# Patient Record
Sex: FEMALE | Race: BLACK OR AFRICAN AMERICAN | NOT HISPANIC OR LATINO | Employment: PART TIME | URBAN - METROPOLITAN AREA
[De-identification: names, ages, dates, MRNs, and addresses within clinical notes are randomized per-mention and may not be internally consistent; named-entity substitution may affect disease eponyms.]

---

## 2017-07-03 ENCOUNTER — HISTORICAL (OUTPATIENT)
Dept: ADMINISTRATIVE | Facility: HOSPITAL | Age: 52
End: 2017-07-03

## 2017-11-01 ENCOUNTER — HISTORICAL (OUTPATIENT)
Dept: ADMINISTRATIVE | Facility: HOSPITAL | Age: 52
End: 2017-11-01

## 2017-11-17 ENCOUNTER — HISTORICAL (OUTPATIENT)
Dept: RADIOLOGY | Facility: HOSPITAL | Age: 52
End: 2017-11-17

## 2018-07-30 ENCOUNTER — HISTORICAL (OUTPATIENT)
Dept: SURGERY | Facility: HOSPITAL | Age: 53
End: 2018-07-30

## 2018-08-01 ENCOUNTER — HISTORICAL (OUTPATIENT)
Dept: RADIOLOGY | Facility: HOSPITAL | Age: 53
End: 2018-08-01

## 2018-08-06 ENCOUNTER — HISTORICAL (OUTPATIENT)
Dept: ANESTHESIOLOGY | Facility: HOSPITAL | Age: 53
End: 2018-08-06

## 2018-10-24 ENCOUNTER — HISTORICAL (OUTPATIENT)
Dept: RADIOLOGY | Facility: HOSPITAL | Age: 53
End: 2018-10-24

## 2018-12-19 ENCOUNTER — HISTORICAL (OUTPATIENT)
Dept: CARDIOLOGY | Facility: HOSPITAL | Age: 53
End: 2018-12-19

## 2021-04-13 ENCOUNTER — HISTORICAL (OUTPATIENT)
Dept: ADMINISTRATIVE | Facility: HOSPITAL | Age: 56
End: 2021-04-13

## 2022-04-28 NOTE — OP NOTE
ADMITTING DIAGNOSIS:  Left carpal tunnel syndrome.    PROCEDURE:  Left carpal tunnel release.    BRIEF HISTORY:  Patient is a 53-year-old  female with narcolepsy who has type 2 diabetes, asthma on an inhaler, hypercholesterolemia, bipolar depression, hypertension, and is morbidly obese at 5 feet 3 inches, 228 pounds.  The patient has hepatitis C secondary to phlebotomy stick, but has a negative viral titer.  She is hypothyroid with OCD and has osteoporosis as well.  The patient required left carpal tunnel release for carpal tunnel syndrome noted on EMG studies and clinically upon presentation.  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  She is status post right carpal tunnel release in the 1990s and has a splint on her left hand.  The left hand is much worse than the right hand.    PROCEDURE:  The patient was brought to the operating room, underwent a tourniquet Eschar marking, local and IV sedation was given, and the patient had incision over the palmar crease with dissection through skin, subcutaneous tissue down to the palmar fascia.  Palmar fascia was opened and the sheath overlying the nerve was released.  Overall, the patient did very well.  Had no problems or difficulties.  The skin and subcu was closed with 4-0 nylon.  Sterile dressings were applied.  No problems were encountered.  The patient tolerated the procedure well.     I appreciate the consultation/referral from her physician, Dr. Barros as well as Dr. Stone at the [facility].        ARNAUD/GAMAL   DD: 08/06/2018 1136   DT: 08/06/2018 1154  Job # [id number]    cc: Dr. Roscoe Stone,

## 2022-06-22 RX ORDER — PREGABALIN 150 MG/1
150 CAPSULE ORAL 2 TIMES DAILY
Qty: 60 CAPSULE | Refills: 3 | Status: SHIPPED | OUTPATIENT
Start: 2022-06-22 | End: 2022-07-29 | Stop reason: SDUPTHER

## 2022-06-22 NOTE — TELEPHONE ENCOUNTER
----- Message from Jessica Tanner sent at 6/22/2022  1:47 PM CDT -----  Regarding: Medication refill  Patient called requesting a refill for Lyrica... [facility]Errol Hernandez

## 2022-07-29 ENCOUNTER — OFFICE VISIT (OUTPATIENT)
Dept: RHEUMATOLOGY | Facility: CLINIC | Age: 57
End: 2022-07-29
Payer: COMMERCIAL

## 2022-07-29 VITALS
BODY MASS INDEX: 41.78 KG/M2 | WEIGHT: 235.81 LBS | OXYGEN SATURATION: 96 % | SYSTOLIC BLOOD PRESSURE: 102 MMHG | HEART RATE: 87 BPM | TEMPERATURE: 98 F | HEIGHT: 63 IN | DIASTOLIC BLOOD PRESSURE: 76 MMHG | RESPIRATION RATE: 18 BRPM

## 2022-07-29 DIAGNOSIS — M79.7 FIBROMYALGIA SYNDROME: ICD-10-CM

## 2022-07-29 DIAGNOSIS — M75.101 ROTATOR CUFF SYNDROME OF RIGHT SHOULDER: ICD-10-CM

## 2022-07-29 DIAGNOSIS — M51.36 DISC DEGENERATION, LUMBAR: ICD-10-CM

## 2022-07-29 DIAGNOSIS — M32.19 OTHER SYSTEMIC LUPUS ERYTHEMATOSUS WITH OTHER ORGAN INVOLVEMENT: Primary | ICD-10-CM

## 2022-07-29 DIAGNOSIS — F51.01 PRIMARY INSOMNIA: ICD-10-CM

## 2022-07-29 PROBLEM — F31.9 BIPOLAR DISORDER: Status: ACTIVE | Noted: 2022-07-29

## 2022-07-29 PROBLEM — J44.9 CHRONIC OBSTRUCTIVE PULMONARY DISEASE: Status: ACTIVE | Noted: 2022-07-29

## 2022-07-29 PROBLEM — K21.9 GASTROESOPHAGEAL REFLUX DISEASE: Status: ACTIVE | Noted: 2022-07-29

## 2022-07-29 PROCEDURE — 3074F PR MOST RECENT SYSTOLIC BLOOD PRESSURE < 130 MM HG: ICD-10-PCS | Mod: CPTII,S$GLB,, | Performed by: INTERNAL MEDICINE

## 2022-07-29 PROCEDURE — 3074F SYST BP LT 130 MM HG: CPT | Mod: CPTII,S$GLB,, | Performed by: INTERNAL MEDICINE

## 2022-07-29 PROCEDURE — 3008F BODY MASS INDEX DOCD: CPT | Mod: CPTII,S$GLB,, | Performed by: INTERNAL MEDICINE

## 2022-07-29 PROCEDURE — 3078F DIAST BP <80 MM HG: CPT | Mod: CPTII,S$GLB,, | Performed by: INTERNAL MEDICINE

## 2022-07-29 PROCEDURE — 99999 PR PBB SHADOW E&M-EST. PATIENT-LVL V: ICD-10-PCS | Mod: PBBFAC,,, | Performed by: INTERNAL MEDICINE

## 2022-07-29 PROCEDURE — 1159F PR MEDICATION LIST DOCUMENTED IN MEDICAL RECORD: ICD-10-PCS | Mod: CPTII,S$GLB,, | Performed by: INTERNAL MEDICINE

## 2022-07-29 PROCEDURE — 1160F PR REVIEW ALL MEDS BY PRESCRIBER/CLIN PHARMACIST DOCUMENTED: ICD-10-PCS | Mod: CPTII,S$GLB,, | Performed by: INTERNAL MEDICINE

## 2022-07-29 PROCEDURE — 3078F PR MOST RECENT DIASTOLIC BLOOD PRESSURE < 80 MM HG: ICD-10-PCS | Mod: CPTII,S$GLB,, | Performed by: INTERNAL MEDICINE

## 2022-07-29 PROCEDURE — 3008F PR BODY MASS INDEX (BMI) DOCUMENTED: ICD-10-PCS | Mod: CPTII,S$GLB,, | Performed by: INTERNAL MEDICINE

## 2022-07-29 PROCEDURE — 99214 PR OFFICE/OUTPT VISIT, EST, LEVL IV, 30-39 MIN: ICD-10-PCS | Mod: S$GLB,,, | Performed by: INTERNAL MEDICINE

## 2022-07-29 PROCEDURE — 4010F PR ACE/ARB THEARPY RXD/TAKEN: ICD-10-PCS | Mod: CPTII,S$GLB,, | Performed by: INTERNAL MEDICINE

## 2022-07-29 PROCEDURE — 1159F MED LIST DOCD IN RCRD: CPT | Mod: CPTII,S$GLB,, | Performed by: INTERNAL MEDICINE

## 2022-07-29 PROCEDURE — 99215 OFFICE O/P EST HI 40 MIN: CPT | Mod: PBBFAC | Performed by: INTERNAL MEDICINE

## 2022-07-29 PROCEDURE — 4010F ACE/ARB THERAPY RXD/TAKEN: CPT | Mod: CPTII,S$GLB,, | Performed by: INTERNAL MEDICINE

## 2022-07-29 PROCEDURE — 99999 PR PBB SHADOW E&M-EST. PATIENT-LVL V: CPT | Mod: PBBFAC,,, | Performed by: INTERNAL MEDICINE

## 2022-07-29 PROCEDURE — 99214 OFFICE O/P EST MOD 30 MIN: CPT | Mod: S$GLB,,, | Performed by: INTERNAL MEDICINE

## 2022-07-29 PROCEDURE — 1160F RVW MEDS BY RX/DR IN RCRD: CPT | Mod: CPTII,S$GLB,, | Performed by: INTERNAL MEDICINE

## 2022-07-29 RX ORDER — CYCLOSPORINE 0.5 MG/ML
1 EMULSION OPHTHALMIC 2 TIMES DAILY
Qty: 3 EACH | Refills: 3 | Status: SHIPPED | OUTPATIENT
Start: 2022-07-29 | End: 2023-08-07

## 2022-07-29 RX ORDER — ALLOPURINOL 300 MG/1
300 TABLET ORAL DAILY
Qty: 90 TABLET | Refills: 3 | Status: SHIPPED | OUTPATIENT
Start: 2022-07-29

## 2022-07-29 RX ORDER — TRIAMCINOLONE ACETONIDE 1 MG/G
CREAM TOPICAL 2 TIMES DAILY
COMMUNITY
Start: 2022-07-01 | End: 2023-04-10

## 2022-07-29 RX ORDER — TIZANIDINE 4 MG/1
4 TABLET ORAL NIGHTLY
COMMUNITY
Start: 2022-07-22 | End: 2022-07-29 | Stop reason: SDUPTHER

## 2022-07-29 RX ORDER — CLONAZEPAM 0.5 MG/1
0.5 TABLET ORAL NIGHTLY PRN
Qty: 90 TABLET | Refills: 3 | Status: SHIPPED | OUTPATIENT
Start: 2022-07-29 | End: 2023-04-10 | Stop reason: SDUPTHER

## 2022-07-29 RX ORDER — CLONAZEPAM 0.5 MG/1
0.5 TABLET ORAL NIGHTLY PRN
COMMUNITY
Start: 2022-04-05 | End: 2022-07-29 | Stop reason: SDUPTHER

## 2022-07-29 RX ORDER — AMOXICILLIN 500 MG
2 CAPSULE ORAL DAILY
COMMUNITY

## 2022-07-29 RX ORDER — LEVOCETIRIZINE DIHYDROCHLORIDE 5 MG/1
5 TABLET, FILM COATED ORAL DAILY
COMMUNITY
Start: 2022-05-04

## 2022-07-29 RX ORDER — CYCLOSPORINE 0.5 MG/ML
1 EMULSION OPHTHALMIC 2 TIMES DAILY
COMMUNITY
Start: 2022-07-13 | End: 2022-07-29 | Stop reason: SDUPTHER

## 2022-07-29 RX ORDER — UMECLIDINIUM 62.5 UG/1
AEROSOL, POWDER ORAL DAILY
COMMUNITY
Start: 2022-07-21 | End: 2022-12-01

## 2022-07-29 RX ORDER — IPRATROPIUM BROMIDE 42 UG/1
1 SPRAY, METERED NASAL 2 TIMES DAILY
COMMUNITY
Start: 2022-03-02

## 2022-07-29 RX ORDER — CELECOXIB 200 MG/1
200 CAPSULE ORAL 2 TIMES DAILY PRN
Qty: 180 CAPSULE | Refills: 3 | Status: SHIPPED | OUTPATIENT
Start: 2022-07-29 | End: 2022-12-01

## 2022-07-29 RX ORDER — PREGABALIN 150 MG/1
150 CAPSULE ORAL 3 TIMES DAILY
Qty: 270 CAPSULE | Refills: 3 | Status: SHIPPED | OUTPATIENT
Start: 2022-07-29 | End: 2022-11-03 | Stop reason: SDUPTHER

## 2022-07-29 RX ORDER — ROSUVASTATIN CALCIUM 5 MG/1
5 TABLET, COATED ORAL DAILY
COMMUNITY
Start: 2022-02-22

## 2022-07-29 RX ORDER — CELECOXIB 200 MG/1
200 CAPSULE ORAL 2 TIMES DAILY
COMMUNITY
Start: 2022-07-15 | End: 2022-07-29 | Stop reason: SDUPTHER

## 2022-07-29 RX ORDER — FERROUS SULFATE 325(65) MG
325 TABLET ORAL DAILY
COMMUNITY
Start: 2022-04-22

## 2022-07-29 RX ORDER — FLUTICASONE FUROATE AND VILANTEROL TRIFENATATE 200; 25 UG/1; UG/1
1 POWDER RESPIRATORY (INHALATION) DAILY
COMMUNITY
Start: 2022-07-24 | End: 2022-12-01

## 2022-07-29 RX ORDER — LIDOCAINE 50 MG/G
1 PATCH TOPICAL DAILY
COMMUNITY
Start: 2022-06-28 | End: 2023-04-10

## 2022-07-29 RX ORDER — TIZANIDINE 4 MG/1
4 TABLET ORAL NIGHTLY
Qty: 90 TABLET | Refills: 3 | Status: SHIPPED | OUTPATIENT
Start: 2022-07-29 | End: 2022-12-01 | Stop reason: SDUPTHER

## 2022-07-29 RX ORDER — ALLOPURINOL 300 MG/1
300 TABLET ORAL DAILY
COMMUNITY
Start: 2022-06-20 | End: 2022-07-29 | Stop reason: SDUPTHER

## 2022-07-29 NOTE — PROGRESS NOTES
"Subjective:       Patient ID: Talia Salas is a 57 y.o. female.    Chief Complaint: Follow-up (Patient is stating that Tizanidine is helping her a lot! She isn't in constant pain /Than before. )    The patient is complaining of joint pain involving the MCP PIP wrist elbow shoulders hips knees and ankles bilaterally.  The pain is 8/10 in intensity dull in quality and continuous.  That is associated with a morning stiffness lasting for more than 60 minutes.  Is also having difficulty maintaining a good night of sleep.  This has been associated with myalgias.  Muscle aches are 7/10 in intensity dull in quality and continuous.  They are associated with fatigue.  No fever no chills no others.      Review of Systems   Constitutional: Negative for appetite change, chills and fever.   HENT: Negative for congestion, ear pain, mouth sores, nosebleeds and trouble swallowing.    Eyes: Negative for photophobia and discharge.   Respiratory: Negative for chest tightness and shortness of breath.    Cardiovascular: Negative for chest pain.   Gastrointestinal: Negative for abdominal pain and vomiting.   Endocrine: Negative.    Genitourinary: Negative for hematuria.   Musculoskeletal:        As per HPI   Skin: Negative for rash.   Neurological: Negative for weakness.         Objective:   /76 (BP Location: Right arm, Patient Position: Sitting, BP Method: Large (Automatic))   Pulse 87   Temp 98.3 °F (36.8 °C) (Oral)   Resp 18   Ht 5' 3" (1.6 m)   Wt 107 kg (235 lb 12.8 oz)   SpO2 96%   BMI 41.77 kg/m²      Physical Exam   Constitutional: She is oriented to person, place, and time. She appears well-developed and well-nourished. No distress.   HENT:   Head: Normocephalic and atraumatic.   Right Ear: External ear normal.   Left Ear: External ear normal.   Eyes: Pupils are equal, round, and reactive to light.   Cardiovascular: Normal rate, regular rhythm and normal heart sounds.   Pulmonary/Chest: Breath sounds " normal.   Abdominal: Soft. There is no abdominal tenderness.   Musculoskeletal:      Right shoulder: Tenderness present.      Left shoulder: Tenderness present.      Right elbow: Tenderness present.      Left elbow: Tenderness present.      Right wrist: Tenderness present.      Left wrist: Tenderness present.      Cervical back: Neck supple.      Right hip: Tenderness present.      Left hip: Tenderness present.      Right knee: Tenderness present.      Left knee: Tenderness present.      Right ankle: Tenderness present.      Left ankle: Tenderness present.   Lymphadenopathy:     She has no cervical adenopathy.   Neurological: She is alert and oriented to person, place, and time. She displays normal reflexes. No cranial nerve deficit or sensory deficit. She exhibits normal muscle tone. Coordination normal.   Skin: No rash noted. No erythema.   Vitals reviewed.      Right Side Rheumatological Exam     The patient is tender to palpation of the shoulder, elbow, wrist, knee, 1st PIP, 1st MCP, 2nd PIP, 2nd MCP, 3rd PIP, 3rd MCP, 4th PIP, 4th MCP, 5th PIP, hip, ankle, 1st MTP, 2nd MTP, 3rd MTP, 4th MTP, 5th MTP, 1st toe IP, 2nd toe IP, 3rd toe IP, 4th toe IP and 5th toe IP    Left Side Rheumatological Exam     The patient is tender to palpation of the shoulder, elbow, wrist, knee, 1st PIP, 1st MCP, 2nd PIP, 2nd MCP, 3rd PIP, 3rd MCP, 4th PIP, 4th MCP, 5th PIP, 5th MCP, hip, ankle, 1st MTP, 2nd MTP, 3rd MTP, 4th MTP, 5th MTP, 1st toe IP, 2nd toe IP, 3rd toe IP, 4th toe IP and 5th toe IP.         Completed Fibromyalgia exam 18/18 tender points.  No data to display     Assessment:       1. Other systemic lupus erythematosus with other organ involvement    2. Disc degeneration, lumbar    3. Fibromyalgia syndrome    4. Primary insomnia    5. Rotator cuff syndrome of right shoulder            Plan:       Problem List Items Addressed This Visit    None     Visit Diagnoses     Other systemic lupus erythematosus with other organ  involvement    -  Primary    Relevant Medications    ferrous sulfate (FEOSOL) 325 mg (65 mg iron) Tab tablet    BREO ELLIPTA 200-25 mcg/dose DsDv diskus inhaler    ipratropium (ATROVENT) 42 mcg (0.06 %) nasal spray    levocetirizine (XYZAL) 5 MG tablet    LIDOcaine (LIDODERM) 5 %    omega-3 fatty acids/fish oil (FISH OIL-OMEGA-3 FATTY ACIDS) 300-1,000 mg capsule    rosuvastatin (CRESTOR) 5 MG tablet    triamcinolone acetonide 0.1% (KENALOG) 0.1 % cream    INCRUSE ELLIPTA 62.5 mcg/actuation inhalation capsule    allopurinoL (ZYLOPRIM) 300 MG tablet    celecoxib (CELEBREX) 200 MG capsule    clonazePAM (KLONOPIN) 0.5 MG tablet    denosumab (PROLIA) 60 mg/mL Syrg    LYRICA 150 mg capsule    RESTASIS 0.05 % ophthalmic emulsion    tiZANidine (ZANAFLEX) 4 MG tablet    Disc degeneration, lumbar        Relevant Medications    ferrous sulfate (FEOSOL) 325 mg (65 mg iron) Tab tablet    BREO ELLIPTA 200-25 mcg/dose DsDv diskus inhaler    ipratropium (ATROVENT) 42 mcg (0.06 %) nasal spray    levocetirizine (XYZAL) 5 MG tablet    LIDOcaine (LIDODERM) 5 %    omega-3 fatty acids/fish oil (FISH OIL-OMEGA-3 FATTY ACIDS) 300-1,000 mg capsule    rosuvastatin (CRESTOR) 5 MG tablet    triamcinolone acetonide 0.1% (KENALOG) 0.1 % cream    INCRUSE ELLIPTA 62.5 mcg/actuation inhalation capsule    allopurinoL (ZYLOPRIM) 300 MG tablet    celecoxib (CELEBREX) 200 MG capsule    clonazePAM (KLONOPIN) 0.5 MG tablet    denosumab (PROLIA) 60 mg/mL Syrg    LYRICA 150 mg capsule    RESTASIS 0.05 % ophthalmic emulsion    tiZANidine (ZANAFLEX) 4 MG tablet    Fibromyalgia syndrome        Relevant Medications    ferrous sulfate (FEOSOL) 325 mg (65 mg iron) Tab tablet    BREO ELLIPTA 200-25 mcg/dose DsDv diskus inhaler    ipratropium (ATROVENT) 42 mcg (0.06 %) nasal spray    levocetirizine (XYZAL) 5 MG tablet    LIDOcaine (LIDODERM) 5 %    omega-3 fatty acids/fish oil (FISH OIL-OMEGA-3 FATTY ACIDS) 300-1,000 mg capsule    rosuvastatin (CRESTOR) 5 MG  tablet    triamcinolone acetonide 0.1% (KENALOG) 0.1 % cream    INCRUSE ELLIPTA 62.5 mcg/actuation inhalation capsule    allopurinoL (ZYLOPRIM) 300 MG tablet    celecoxib (CELEBREX) 200 MG capsule    clonazePAM (KLONOPIN) 0.5 MG tablet    denosumab (PROLIA) 60 mg/mL Syrg    LYRICA 150 mg capsule    RESTASIS 0.05 % ophthalmic emulsion    tiZANidine (ZANAFLEX) 4 MG tablet    Primary insomnia        Relevant Medications    ferrous sulfate (FEOSOL) 325 mg (65 mg iron) Tab tablet    BREO ELLIPTA 200-25 mcg/dose DsDv diskus inhaler    ipratropium (ATROVENT) 42 mcg (0.06 %) nasal spray    levocetirizine (XYZAL) 5 MG tablet    LIDOcaine (LIDODERM) 5 %    omega-3 fatty acids/fish oil (FISH OIL-OMEGA-3 FATTY ACIDS) 300-1,000 mg capsule    rosuvastatin (CRESTOR) 5 MG tablet    triamcinolone acetonide 0.1% (KENALOG) 0.1 % cream    INCRUSE ELLIPTA 62.5 mcg/actuation inhalation capsule    allopurinoL (ZYLOPRIM) 300 MG tablet    celecoxib (CELEBREX) 200 MG capsule    clonazePAM (KLONOPIN) 0.5 MG tablet    denosumab (PROLIA) 60 mg/mL Syrg    LYRICA 150 mg capsule    RESTASIS 0.05 % ophthalmic emulsion    tiZANidine (ZANAFLEX) 4 MG tablet    Rotator cuff syndrome of right shoulder        Relevant Medications    ferrous sulfate (FEOSOL) 325 mg (65 mg iron) Tab tablet    BREO ELLIPTA 200-25 mcg/dose DsDv diskus inhaler    ipratropium (ATROVENT) 42 mcg (0.06 %) nasal spray    levocetirizine (XYZAL) 5 MG tablet    LIDOcaine (LIDODERM) 5 %    omega-3 fatty acids/fish oil (FISH OIL-OMEGA-3 FATTY ACIDS) 300-1,000 mg capsule    rosuvastatin (CRESTOR) 5 MG tablet    triamcinolone acetonide 0.1% (KENALOG) 0.1 % cream    INCRUSE ELLIPTA 62.5 mcg/actuation inhalation capsule    allopurinoL (ZYLOPRIM) 300 MG tablet    celecoxib (CELEBREX) 200 MG capsule    clonazePAM (KLONOPIN) 0.5 MG tablet    denosumab (PROLIA) 60 mg/mL Syrg    LYRICA 150 mg capsule    RESTASIS 0.05 % ophthalmic emulsion    tiZANidine (ZANAFLEX) 4 MG tablet

## 2022-08-03 ENCOUNTER — TELEPHONE (OUTPATIENT)
Dept: RHEUMATOLOGY | Facility: CLINIC | Age: 57
End: 2022-08-03
Payer: COMMERCIAL

## 2022-08-03 NOTE — TELEPHONE ENCOUNTER
This message has already been addressed below . Thanks       ----- Message from Jessica Tanner sent at 8/3/2022 10:06 AM CDT -----  Regarding: medication  Hima with [facility] need a call back concerning medical supplies. [phone]

## 2022-08-03 NOTE — TELEPHONE ENCOUNTER
[facility]oke with [facility] and the patient gets the Prolia injection at Dr. Kimmy Abreu office . The medical supplies will be sent there. Thanks       ----- Message from Jessica Tanner sent at 8/2/2022  8:20 AM CDT -----  Regarding: medical supplies  [facility] called to confirm order for medical supplies to be delivered here for patient. [employer] container and swabs. Please call to confirm. [phone]

## 2022-11-03 DIAGNOSIS — M79.7 FIBROMYALGIA SYNDROME: ICD-10-CM

## 2022-11-03 DIAGNOSIS — M51.36 DISC DEGENERATION, LUMBAR: ICD-10-CM

## 2022-11-03 DIAGNOSIS — M32.19 OTHER SYSTEMIC LUPUS ERYTHEMATOSUS WITH OTHER ORGAN INVOLVEMENT: ICD-10-CM

## 2022-11-03 DIAGNOSIS — M75.101 ROTATOR CUFF SYNDROME OF RIGHT SHOULDER: ICD-10-CM

## 2022-11-03 DIAGNOSIS — F51.01 PRIMARY INSOMNIA: ICD-10-CM

## 2022-11-03 RX ORDER — PREGABALIN 150 MG/1
150 CAPSULE ORAL 3 TIMES DAILY
Qty: 270 CAPSULE | Refills: 3 | Status: SHIPPED | OUTPATIENT
Start: 2022-11-03 | End: 2022-12-01 | Stop reason: SDUPTHER

## 2022-11-03 NOTE — TELEPHONE ENCOUNTER
----- Message from Jessica Tanner sent at 11/3/2022  9:13 AM CDT -----  Regarding: Medication refill  Patient called requesting refill for Lyrica. [facility] on [address].

## 2022-11-08 ENCOUNTER — TELEPHONE (OUTPATIENT)
Dept: RHEUMATOLOGY | Facility: CLINIC | Age: 57
End: 2022-11-08
Payer: COMMERCIAL

## 2022-11-08 NOTE — TELEPHONE ENCOUNTER
----- Message from Elizabeth Padilla sent at 11/8/2022 10:59 AM CST -----  Regarding: medical advice  Pt is having itching episodes and was not sure if it is normal for someone with lupus.  Please call [phone] with a response

## 2022-11-09 NOTE — TELEPHONE ENCOUNTER
Spoke with patient she hasn't had any changes in detergent, or lotions , she hasn't started any new supplements at all. She does have a Cat and a Dog that goes stays inside and goes outside. I did advise her that this could be from her pets .. patient verbalized understanding . Thanks

## 2022-11-09 NOTE — TELEPHONE ENCOUNTER
Normally not. Did she should not itch. Many things can cause itching:  Supplements, new meds, dry skin, hot water, sweat, type of lotion, detergent, pets, plants, allergies, foods, and many others. You may ask her about those. Thanks bh

## 2022-12-01 ENCOUNTER — OFFICE VISIT (OUTPATIENT)
Dept: RHEUMATOLOGY | Facility: CLINIC | Age: 57
End: 2022-12-01
Payer: COMMERCIAL

## 2022-12-01 VITALS
OXYGEN SATURATION: 95 % | WEIGHT: 238.81 LBS | SYSTOLIC BLOOD PRESSURE: 118 MMHG | HEIGHT: 63 IN | TEMPERATURE: 99 F | HEART RATE: 94 BPM | DIASTOLIC BLOOD PRESSURE: 79 MMHG | BODY MASS INDEX: 42.31 KG/M2

## 2022-12-01 DIAGNOSIS — M51.36 DISC DEGENERATION, LUMBAR: ICD-10-CM

## 2022-12-01 DIAGNOSIS — F51.01 PRIMARY INSOMNIA: ICD-10-CM

## 2022-12-01 DIAGNOSIS — M32.10 SYSTEMIC LUPUS ERYTHEMATOSUS, ORGAN OR SYSTEM INVOLVEMENT UNSPECIFIED: ICD-10-CM

## 2022-12-01 DIAGNOSIS — M75.101 ROTATOR CUFF SYNDROME OF RIGHT SHOULDER: ICD-10-CM

## 2022-12-01 DIAGNOSIS — M79.7 FIBROMYALGIA SYNDROME: ICD-10-CM

## 2022-12-01 DIAGNOSIS — M32.19 OTHER SYSTEMIC LUPUS ERYTHEMATOSUS WITH OTHER ORGAN INVOLVEMENT: ICD-10-CM

## 2022-12-01 PROBLEM — M51.369 DISC DEGENERATION, LUMBAR: Status: ACTIVE | Noted: 2022-12-01

## 2022-12-01 PROCEDURE — 1159F MED LIST DOCD IN RCRD: CPT | Mod: CPTII,S$GLB,, | Performed by: INTERNAL MEDICINE

## 2022-12-01 PROCEDURE — 3008F PR BODY MASS INDEX (BMI) DOCUMENTED: ICD-10-PCS | Mod: CPTII,S$GLB,, | Performed by: INTERNAL MEDICINE

## 2022-12-01 PROCEDURE — 3078F DIAST BP <80 MM HG: CPT | Mod: CPTII,S$GLB,, | Performed by: INTERNAL MEDICINE

## 2022-12-01 PROCEDURE — 4010F PR ACE/ARB THEARPY RXD/TAKEN: ICD-10-PCS | Mod: CPTII,S$GLB,, | Performed by: INTERNAL MEDICINE

## 2022-12-01 PROCEDURE — 99214 PR OFFICE/OUTPT VISIT, EST, LEVL IV, 30-39 MIN: ICD-10-PCS | Mod: S$GLB,,, | Performed by: INTERNAL MEDICINE

## 2022-12-01 PROCEDURE — 3074F PR MOST RECENT SYSTOLIC BLOOD PRESSURE < 130 MM HG: ICD-10-PCS | Mod: CPTII,S$GLB,, | Performed by: INTERNAL MEDICINE

## 2022-12-01 PROCEDURE — 99999 PR PBB SHADOW E&M-EST. PATIENT-LVL III: ICD-10-PCS | Mod: PBBFAC,,, | Performed by: INTERNAL MEDICINE

## 2022-12-01 PROCEDURE — 99214 OFFICE O/P EST MOD 30 MIN: CPT | Mod: S$GLB,,, | Performed by: INTERNAL MEDICINE

## 2022-12-01 PROCEDURE — 1159F PR MEDICATION LIST DOCUMENTED IN MEDICAL RECORD: ICD-10-PCS | Mod: CPTII,S$GLB,, | Performed by: INTERNAL MEDICINE

## 2022-12-01 PROCEDURE — 99999 PR PBB SHADOW E&M-EST. PATIENT-LVL III: CPT | Mod: PBBFAC,,, | Performed by: INTERNAL MEDICINE

## 2022-12-01 PROCEDURE — 3074F SYST BP LT 130 MM HG: CPT | Mod: CPTII,S$GLB,, | Performed by: INTERNAL MEDICINE

## 2022-12-01 PROCEDURE — 3078F PR MOST RECENT DIASTOLIC BLOOD PRESSURE < 80 MM HG: ICD-10-PCS | Mod: CPTII,S$GLB,, | Performed by: INTERNAL MEDICINE

## 2022-12-01 PROCEDURE — 4010F ACE/ARB THERAPY RXD/TAKEN: CPT | Mod: CPTII,S$GLB,, | Performed by: INTERNAL MEDICINE

## 2022-12-01 PROCEDURE — 3008F BODY MASS INDEX DOCD: CPT | Mod: CPTII,S$GLB,, | Performed by: INTERNAL MEDICINE

## 2022-12-01 PROCEDURE — 99213 OFFICE O/P EST LOW 20 MIN: CPT | Mod: PBBFAC | Performed by: INTERNAL MEDICINE

## 2022-12-01 RX ORDER — MELOXICAM 15 MG/1
15 TABLET ORAL DAILY
Qty: 90 TABLET | Refills: 5 | Status: SHIPPED | OUTPATIENT
Start: 2022-12-01 | End: 2023-04-10 | Stop reason: SDUPTHER

## 2022-12-01 RX ORDER — TIZANIDINE 4 MG/1
8 TABLET ORAL NIGHTLY
Qty: 180 TABLET | Refills: 3 | Status: SHIPPED | OUTPATIENT
Start: 2022-12-01 | End: 2023-04-10 | Stop reason: SDUPTHER

## 2022-12-01 RX ORDER — BENAZEPRIL HYDROCHLORIDE 40 MG/1
40 TABLET ORAL DAILY
COMMUNITY
Start: 2022-09-10

## 2022-12-01 RX ORDER — HYDROXYCHLOROQUINE SULFATE 200 MG/1
TABLET, FILM COATED ORAL
Qty: 60 TABLET | Refills: 5 | Status: SHIPPED | OUTPATIENT
Start: 2022-12-01 | End: 2023-04-10 | Stop reason: SDUPTHER

## 2022-12-01 RX ORDER — DULAGLUTIDE 0.75 MG/.5ML
0.75 INJECTION, SOLUTION SUBCUTANEOUS
COMMUNITY
Start: 2022-11-22

## 2022-12-01 RX ORDER — FLUTICASONE FUROATE, UMECLIDINIUM BROMIDE AND VILANTEROL TRIFENATATE 200; 62.5; 25 UG/1; UG/1; UG/1
1 POWDER RESPIRATORY (INHALATION) DAILY
COMMUNITY
Start: 2022-11-07

## 2022-12-01 RX ORDER — PREGABALIN 150 MG/1
150 CAPSULE ORAL 3 TIMES DAILY
Qty: 270 CAPSULE | Refills: 3 | Status: SHIPPED | OUTPATIENT
Start: 2022-12-01 | End: 2023-04-10 | Stop reason: SDUPTHER

## 2022-12-01 NOTE — PROGRESS NOTES
"Subjective:       Patient ID: Talia Salas is a 57 y.o. female.    Chief Complaint: Follow-up (Patient complains of back pain 10/10 when trying to do housework but is a 0/10 at this moment at rest.)    The patient is complaining of joint pain involving the MCP PIP wrist elbow shoulders hips knees and ankles bilaterally.  The pain is 3/10 in intensity dull in quality and continuous.  That is associated with a morning stiffness lasting for more than 60 minutes.  Is also having difficulty maintaining a good night of sleep.  This has been associated with myalgias.  Muscle aches are 5/10 in intensity dull in quality and continuous.  They are associated with fatigue.  No fever no chills no others.      Review of Systems   Constitutional:  Negative for appetite change, chills and fever.   HENT:  Negative for congestion, ear pain, mouth sores, nosebleeds and trouble swallowing.    Eyes:  Negative for photophobia and discharge.   Respiratory:  Negative for chest tightness and shortness of breath.    Cardiovascular:  Negative for chest pain.   Gastrointestinal:  Negative for abdominal pain and vomiting.   Endocrine: Negative.    Genitourinary:  Negative for hematuria.   Musculoskeletal:         As per HPI   Skin:  Negative for rash.   Neurological:  Negative for weakness.       Objective:   /79 (BP Location: Left arm, Patient Position: Sitting, BP Method: Large (Automatic))   Pulse 94   Temp 98.6 °F (37 °C) (Oral)   Ht 5' 3" (1.6 m)   Wt 108.3 kg (238 lb 12.8 oz)   SpO2 95%   BMI 42.30 kg/m²      Physical Exam   Constitutional: She is oriented to person, place, and time. She appears well-developed and well-nourished. No distress.   HENT:   Head: Normocephalic and atraumatic.   Right Ear: External ear normal.   Left Ear: External ear normal.   Eyes: Pupils are equal, round, and reactive to light.   Cardiovascular: Normal rate, regular rhythm and normal heart sounds.   Pulmonary/Chest: Breath sounds " normal.   Abdominal: Soft. There is no abdominal tenderness.   Musculoskeletal:      Right shoulder: Tenderness present.      Left shoulder: Tenderness present.      Right elbow: Tenderness present.      Left elbow: Tenderness present.      Right wrist: Tenderness present.      Left wrist: Tenderness present.      Cervical back: Neck supple.      Right hip: Tenderness present.      Left hip: Tenderness present.      Right knee: Tenderness present.      Left knee: Tenderness present.      Right ankle: Tenderness present.      Left ankle: Tenderness present.   Lymphadenopathy:     She has no cervical adenopathy.   Neurological: She is alert and oriented to person, place, and time. She displays normal reflexes. No cranial nerve deficit or sensory deficit. She exhibits normal muscle tone. Coordination normal.   Skin: No rash noted. No erythema.   Vitals reviewed.      Right Side Rheumatological Exam     The patient is tender to palpation of the shoulder, elbow, wrist, knee, 1st PIP, 1st MCP, 2nd PIP, 2nd MCP, 3rd PIP, 3rd MCP, 4th PIP, 4th MCP, 5th PIP, hip, ankle, 1st MTP, 2nd MTP, 3rd MTP, 4th MTP, 5th MTP, 1st toe IP, 2nd toe IP, 3rd toe IP, 4th toe IP and 5th toe IP    Left Side Rheumatological Exam     The patient is tender to palpation of the shoulder, elbow, wrist, knee, 1st PIP, 1st MCP, 2nd PIP, 2nd MCP, 3rd PIP, 3rd MCP, 4th PIP, 4th MCP, 5th PIP, 5th MCP, hip, ankle, 1st MTP, 2nd MTP, 3rd MTP, 4th MTP, 5th MTP, 1st toe IP, 2nd toe IP, 3rd toe IP, 4th toe IP and 5th toe IP.       Completed Fibromyalgia exam 18/18 tender points.  No data to display     Assessment:       1. Systemic lupus erythematosus, organ or system involvement unspecified    2. Disc degeneration, lumbar    3. Fibromyalgia syndrome    4. Primary insomnia    5. Other systemic lupus erythematosus with other organ involvement    6. Rotator cuff syndrome of right shoulder            Medication List with Changes/Refills   New Medications     MELOXICAM (MOBIC) 15 MG TABLET    Take 1 tablet (15 mg total) by mouth once daily. After lunch       Start Date: 12/1/2022 End Date: 5/30/2023   Current Medications    ALBUTEROL (PROVENTIL) 2.5 MG /3 ML (0.083 %) NEBULIZER SOLUTION    Take 2.5 mg by nebulization daily as needed.        Start Date: 11/19/2015End Date: --    ALLOPURINOL (ZYLOPRIM) 300 MG TABLET    Take 1 tablet (300 mg total) by mouth once daily.       Start Date: 7/29/2022 End Date: --    BENAZEPRIL (LOTENSIN) 40 MG TABLET    Take 40 mg by mouth once daily.       Start Date: 9/10/2022 End Date: --    CLONAZEPAM (KLONOPIN) 0.5 MG TABLET    Take 1 tablet (0.5 mg total) by mouth nightly as needed for Anxiety.       Start Date: 7/29/2022 End Date: --    DENOSUMAB (PROLIA) 60 MG/ML SYRG    Inject 1 mL (60 mg total) into the skin every 6 (six) months.       Start Date: 7/29/2022 End Date: --    DEXILANT 60 MG CAPSULE    Take 60 mg by mouth once daily.        Start Date: 2/4/2016  End Date: --    FERROUS SULFATE (FEOSOL) 325 MG (65 MG IRON) TAB TABLET    Take 325 mg by mouth once daily.       Start Date: 4/22/2022 End Date: --    FLUTICASONE (FLONASE) 50 MCG/ACTUATION NASAL SPRAY    1 spray by Nasal route 2 (two) times daily.        Start Date: 12/17/2015End Date: --    IPRATROPIUM (ATROVENT) 42 MCG (0.06 %) NASAL SPRAY    1 spray 2 (two) times daily.       Start Date: 3/2/2022  End Date: --    LATUDA 60 MG TAB TABLET    once daily.        Start Date: 5/22/2016 End Date: --    LEVOCETIRIZINE (XYZAL) 5 MG TABLET    Take 5 mg by mouth once daily.       Start Date: 5/4/2022  End Date: --    LEVOTHYROXINE (SYNTHROID) 112 MCG TABLET    Take 112 mcg by mouth before breakfast.        Start Date: 2/18/2016 End Date: --    LIDOCAINE (LIDODERM) 5 %    Place 1 patch onto the skin once daily.       Start Date: 6/28/2022 End Date: --    METFORMIN (GLUCOPHAGE) 500 MG TABLET    Take 250 mg by mouth 2 (two) times daily with meals.       Start Date: 2/8/2016  End Date: --     MONTELUKAST (SINGULAIR) 10 MG TABLET    Take 10 mg by mouth once daily.        Start Date: 1/29/2016 End Date: --    NUVIGIL 250 MG TABLET    Take 250 mg by mouth every morning.       Start Date: 5/16/2016 End Date: --    OMEGA-3 FATTY ACIDS/FISH OIL (FISH OIL-OMEGA-3 FATTY ACIDS) 300-1,000 MG CAPSULE    Take 2 g by mouth once daily at 6am.       Start Date: --        End Date: --    ONETOUCH DELICA LANCETS 33 GAUGE MISC    1 lancet 2 (two) times a day.       Start Date: 12/23/2015End Date: --    ONETOUCH VERIO STRP           Start Date: 1/26/2016 End Date: --    PATADAY 0.2 % DROP           Start Date: 2/7/2016  End Date: --    RESTASIS 0.05 % OPHTHALMIC EMULSION    Place 1 drop into both eyes 2 (two) times daily.       Start Date: 7/29/2022 End Date: --    ROSUVASTATIN (CRESTOR) 5 MG TABLET    Take 5 mg by mouth once daily.       Start Date: 2/22/2022 End Date: --    TRELEGY ELLIPTA 200-62.5-25 MCG INHALER    Inhale 1 puff into the lungs once daily.       Start Date: 11/7/2022 End Date: --    TRIAMCINOLONE ACETONIDE 0.1% (KENALOG) 0.1 % CREAM    Apply topically 2 (two) times daily.       Start Date: 7/1/2022  End Date: --    TRIAMTERENE-HYDROCHLOROTHIAZIDE 37.5-25 MG (DYAZIDE) 37.5-25 MG PER CAPSULE    Take 1 capsule by mouth once daily.       Start Date: 2/8/2016  End Date: --    TRULICITY 0.75 MG/0.5 ML PEN INJECTOR    Inject 0.75 mg into the skin every 7 days.       Start Date: 11/22/2022End Date: --    VENLAFAXINE (EFFEXOR-XR) 75 MG 24 HR CAPSULE    Take 75 mg by mouth once daily.       Start Date: 5/8/2016  End Date: --    VENTOLIN HFA 90 MCG/ACTUATION INHALER    1 puff daily as needed.       Start Date: 1/1/2016  End Date: --   Changed and/or Refilled Medications    Modified Medication Previous Medication    HYDROXYCHLOROQUINE (PLAQUENIL) 200 MG TABLET hydrOXYchloroQUINE (PLAQUENIL) 200 mg tablet       TAKE 1 TABLET BY MOUTH TWICE DAILY WITH FOOD OR MILK    TAKE 1 TABLET BY MOUTH TWICE DAILY WITH FOOD OR  MILK       Start Date: 12/1/2022 End Date: --    Start Date: 10/17/2022End Date: 12/1/2022    LYRICA 150 MG CAPSULE LYRICA 150 mg capsule       Take 1 capsule (150 mg total) by mouth 3 (three) times daily.    Take 1 capsule (150 mg total) by mouth 3 (three) times daily.       Start Date: 12/1/2022 End Date: --    Start Date: 11/3/2022 End Date: 12/1/2022    TIZANIDINE (ZANAFLEX) 4 MG TABLET tiZANidine (ZANAFLEX) 4 MG tablet       Take 2 tablets (8 mg total) by mouth nightly.    Take 1 tablet (4 mg total) by mouth nightly.       Start Date: 12/1/2022 End Date: --    Start Date: 7/29/2022 End Date: 12/1/2022   Discontinued Medications    ADVAIR DISKUS 250-50 MCG/DOSE DISKUS INHALER    Inhale 1 puff into the lungs 2 (two) times daily.        Start Date: 1/1/2016  End Date: 12/1/2022    BENAZEPRIL (LOTENSIN) 20 MG TABLET    Take 20 mg by mouth once daily.       Start Date: 2/14/2016 End Date: 12/1/2022    BREO ELLIPTA 200-25 MCG/DOSE DSDV DISKUS INHALER    Inhale 1 puff into the lungs once daily.       Start Date: 7/24/2022 End Date: 12/1/2022    CELECOXIB (CELEBREX) 200 MG CAPSULE    Take 1 capsule (200 mg total) by mouth 2 (two) times daily as needed for Pain (after food).       Start Date: 7/29/2022 End Date: 12/1/2022    INCRUSE ELLIPTA 62.5 MCG/ACTUATION INHALATION CAPSULE    Inhale into the lungs once daily.       Start Date: 7/21/2022 End Date: 12/1/2022         Plan:         Problem List Items Addressed This Visit          Neuro    Disc degeneration, lumbar    Relevant Medications    meloxicam (MOBIC) 15 MG tablet    hydrOXYchloroQUINE (PLAQUENIL) 200 mg tablet    LYRICA 150 mg capsule    tiZANidine (ZANAFLEX) 4 MG tablet       Immunology/Multi System    Systemic lupus erythematosus, organ or system involvement unspecified    Relevant Medications    meloxicam (MOBIC) 15 MG tablet    hydrOXYchloroQUINE (PLAQUENIL) 200 mg tablet    LYRICA 150 mg capsule    tiZANidine (ZANAFLEX) 4 MG tablet       Orthopedic     Fibromyalgia syndrome    Relevant Medications    meloxicam (MOBIC) 15 MG tablet    hydrOXYchloroQUINE (PLAQUENIL) 200 mg tablet    LYRICA 150 mg capsule    tiZANidine (ZANAFLEX) 4 MG tablet    Rotator cuff syndrome of right shoulder    Relevant Medications    meloxicam (MOBIC) 15 MG tablet    hydrOXYchloroQUINE (PLAQUENIL) 200 mg tablet    LYRICA 150 mg capsule    tiZANidine (ZANAFLEX) 4 MG tablet       Other    Primary insomnia    Relevant Medications    meloxicam (MOBIC) 15 MG tablet    hydrOXYchloroQUINE (PLAQUENIL) 200 mg tablet    LYRICA 150 mg capsule    tiZANidine (ZANAFLEX) 4 MG tablet     Other Visit Diagnoses       Other systemic lupus erythematosus with other organ involvement        Relevant Medications    meloxicam (MOBIC) 15 MG tablet    hydrOXYchloroQUINE (PLAQUENIL) 200 mg tablet    LYRICA 150 mg capsule    tiZANidine (ZANAFLEX) 4 MG tablet

## 2023-04-10 ENCOUNTER — OFFICE VISIT (OUTPATIENT)
Dept: RHEUMATOLOGY | Facility: CLINIC | Age: 58
End: 2023-04-10
Payer: COMMERCIAL

## 2023-04-10 VITALS
HEIGHT: 63 IN | WEIGHT: 227.81 LBS | OXYGEN SATURATION: 96 % | HEART RATE: 92 BPM | DIASTOLIC BLOOD PRESSURE: 75 MMHG | SYSTOLIC BLOOD PRESSURE: 107 MMHG | TEMPERATURE: 99 F | BODY MASS INDEX: 40.36 KG/M2

## 2023-04-10 DIAGNOSIS — M51.36 DISC DEGENERATION, LUMBAR: ICD-10-CM

## 2023-04-10 DIAGNOSIS — J44.9 CHRONIC OBSTRUCTIVE PULMONARY DISEASE, UNSPECIFIED COPD TYPE: ICD-10-CM

## 2023-04-10 DIAGNOSIS — M75.101 ROTATOR CUFF SYNDROME OF RIGHT SHOULDER: ICD-10-CM

## 2023-04-10 DIAGNOSIS — K21.9 GASTROESOPHAGEAL REFLUX DISEASE, UNSPECIFIED WHETHER ESOPHAGITIS PRESENT: ICD-10-CM

## 2023-04-10 DIAGNOSIS — F51.01 PRIMARY INSOMNIA: ICD-10-CM

## 2023-04-10 DIAGNOSIS — M79.7 FIBROMYALGIA SYNDROME: ICD-10-CM

## 2023-04-10 DIAGNOSIS — M32.10 SYSTEMIC LUPUS ERYTHEMATOSUS, ORGAN OR SYSTEM INVOLVEMENT UNSPECIFIED: Primary | ICD-10-CM

## 2023-04-10 DIAGNOSIS — F31.9 BIPOLAR AFFECTIVE DISORDER, REMISSION STATUS UNSPECIFIED: ICD-10-CM

## 2023-04-10 PROCEDURE — 4010F ACE/ARB THERAPY RXD/TAKEN: CPT | Mod: CPTII,S$GLB,, | Performed by: INTERNAL MEDICINE

## 2023-04-10 PROCEDURE — 99214 OFFICE O/P EST MOD 30 MIN: CPT | Mod: S$GLB,,, | Performed by: INTERNAL MEDICINE

## 2023-04-10 PROCEDURE — 1159F PR MEDICATION LIST DOCUMENTED IN MEDICAL RECORD: ICD-10-PCS | Mod: CPTII,S$GLB,, | Performed by: INTERNAL MEDICINE

## 2023-04-10 PROCEDURE — 3078F DIAST BP <80 MM HG: CPT | Mod: CPTII,S$GLB,, | Performed by: INTERNAL MEDICINE

## 2023-04-10 PROCEDURE — 99214 PR OFFICE/OUTPT VISIT, EST, LEVL IV, 30-39 MIN: ICD-10-PCS | Mod: S$GLB,,, | Performed by: INTERNAL MEDICINE

## 2023-04-10 PROCEDURE — 99215 OFFICE O/P EST HI 40 MIN: CPT | Mod: PBBFAC | Performed by: INTERNAL MEDICINE

## 2023-04-10 PROCEDURE — 3008F BODY MASS INDEX DOCD: CPT | Mod: CPTII,S$GLB,, | Performed by: INTERNAL MEDICINE

## 2023-04-10 PROCEDURE — 4010F PR ACE/ARB THEARPY RXD/TAKEN: ICD-10-PCS | Mod: CPTII,S$GLB,, | Performed by: INTERNAL MEDICINE

## 2023-04-10 PROCEDURE — 99999 PR PBB SHADOW E&M-EST. PATIENT-LVL V: CPT | Mod: PBBFAC,,, | Performed by: INTERNAL MEDICINE

## 2023-04-10 PROCEDURE — 3074F PR MOST RECENT SYSTOLIC BLOOD PRESSURE < 130 MM HG: ICD-10-PCS | Mod: CPTII,S$GLB,, | Performed by: INTERNAL MEDICINE

## 2023-04-10 PROCEDURE — 99999 PR PBB SHADOW E&M-EST. PATIENT-LVL V: ICD-10-PCS | Mod: PBBFAC,,, | Performed by: INTERNAL MEDICINE

## 2023-04-10 PROCEDURE — 3008F PR BODY MASS INDEX (BMI) DOCUMENTED: ICD-10-PCS | Mod: CPTII,S$GLB,, | Performed by: INTERNAL MEDICINE

## 2023-04-10 PROCEDURE — 1159F MED LIST DOCD IN RCRD: CPT | Mod: CPTII,S$GLB,, | Performed by: INTERNAL MEDICINE

## 2023-04-10 PROCEDURE — 3078F PR MOST RECENT DIASTOLIC BLOOD PRESSURE < 80 MM HG: ICD-10-PCS | Mod: CPTII,S$GLB,, | Performed by: INTERNAL MEDICINE

## 2023-04-10 PROCEDURE — 3074F SYST BP LT 130 MM HG: CPT | Mod: CPTII,S$GLB,, | Performed by: INTERNAL MEDICINE

## 2023-04-10 RX ORDER — BUPROPION HYDROCHLORIDE 150 MG/1
150 TABLET, EXTENDED RELEASE ORAL 2 TIMES DAILY
COMMUNITY
Start: 2023-03-23

## 2023-04-10 RX ORDER — PREGABALIN 150 MG/1
150 CAPSULE ORAL 3 TIMES DAILY
Qty: 270 CAPSULE | Refills: 3 | Status: SHIPPED | OUTPATIENT
Start: 2023-04-10 | End: 2023-08-18 | Stop reason: SDUPTHER

## 2023-04-10 RX ORDER — CLONAZEPAM 0.5 MG/1
0.5 TABLET ORAL NIGHTLY PRN
Qty: 90 TABLET | Refills: 3 | Status: SHIPPED | OUTPATIENT
Start: 2023-04-10

## 2023-04-10 RX ORDER — MELOXICAM 15 MG/1
15 TABLET ORAL DAILY
Qty: 90 TABLET | Refills: 5 | Status: SHIPPED | OUTPATIENT
Start: 2023-04-10 | End: 2023-08-18 | Stop reason: SDUPTHER

## 2023-04-10 RX ORDER — HYDROXYCHLOROQUINE SULFATE 200 MG/1
TABLET, FILM COATED ORAL
Qty: 180 TABLET | Refills: 3 | Status: SHIPPED | OUTPATIENT
Start: 2023-04-10 | End: 2023-08-18 | Stop reason: SDUPTHER

## 2023-04-10 RX ORDER — TIZANIDINE 4 MG/1
8 TABLET ORAL NIGHTLY
Qty: 180 TABLET | Refills: 3 | Status: SHIPPED | OUTPATIENT
Start: 2023-04-10 | End: 2023-08-18 | Stop reason: SDUPTHER

## 2023-04-10 RX ORDER — OMEPRAZOLE 40 MG/1
40 CAPSULE, DELAYED RELEASE ORAL EVERY MORNING
Qty: 90 CAPSULE | Refills: 3 | Status: SHIPPED | OUTPATIENT
Start: 2023-04-10 | End: 2023-08-18 | Stop reason: SDUPTHER

## 2023-04-10 RX ORDER — FOLIC ACID 1 MG/1
1 TABLET ORAL DAILY
Qty: 90 TABLET | Refills: 3 | Status: SHIPPED | OUTPATIENT
Start: 2023-04-10 | End: 2023-08-18

## 2023-04-10 RX ORDER — METHOTREXATE 2.5 MG/1
10 TABLET ORAL
Qty: 60 TABLET | Refills: 3 | Status: SHIPPED | OUTPATIENT
Start: 2023-04-10 | End: 2023-08-18

## 2023-04-10 NOTE — PROGRESS NOTES
"Subjective:       Patient ID: Talia Salas is a 57 y.o. female.    Chief Complaint: Follow-up (Follow up. Patient complains of back and right shoulder pain . She states that her right arm will give out on her at times due to her right shoulder. Pain 5/10)    The patient is complaining of joint pain involving the MCP PIP wrist elbow shoulders hips knees and ankles bilaterally.  The pain is 6/10 in intensity dull in quality and continuous.  That is associated with a morning stiffness lasting for more than 60 minutes.  Is also having difficulty maintaining a good night of sleep.  This has been associated with myalgias.  Muscle aches are 5/10 in intensity dull in quality and continuous.  They are associated with fatigue.  No fever no chills no others.  Labs checked during this visit       Review of Systems   Constitutional:  Negative for appetite change, chills and fever.   HENT:  Negative for congestion, ear pain, mouth sores, nosebleeds and trouble swallowing.    Eyes:  Negative for photophobia and discharge.   Respiratory:  Negative for chest tightness and shortness of breath.    Cardiovascular:  Negative for chest pain.   Gastrointestinal:  Negative for abdominal pain and vomiting.   Endocrine: Negative.    Genitourinary:  Negative for hematuria.   Musculoskeletal:         As per HPI   Skin:  Negative for rash.   Neurological:  Negative for weakness.       Objective:   /75 (BP Location: Left arm, Patient Position: Sitting, BP Method: Large (Automatic))   Pulse 92   Temp 98.6 °F (37 °C) (Oral)   Ht 5' 3" (1.6 m)   Wt 103.3 kg (227 lb 12.8 oz)   SpO2 96%   BMI 40.35 kg/m²      Physical Exam   Constitutional: She is oriented to person, place, and time. She appears well-developed and well-nourished. No distress.   HENT:   Head: Normocephalic and atraumatic.   Right Ear: External ear normal.   Left Ear: External ear normal.   Eyes: Pupils are equal, round, and reactive to light.   Cardiovascular: " Normal rate, regular rhythm and normal heart sounds.   Pulmonary/Chest: Breath sounds normal.   Abdominal: Soft. There is no abdominal tenderness.   Musculoskeletal:      Right shoulder: Tenderness present.      Left shoulder: Tenderness present.      Right elbow: Tenderness present.      Left elbow: Tenderness present.      Right wrist: Tenderness present.      Left wrist: Tenderness present.      Cervical back: Neck supple.      Right hip: Tenderness present.      Left hip: Tenderness present.      Right knee: Tenderness present.      Left knee: Tenderness present.      Right ankle: Tenderness present.      Left ankle: Tenderness present.   Lymphadenopathy:     She has no cervical adenopathy.   Neurological: She is alert and oriented to person, place, and time. She displays normal reflexes. No cranial nerve deficit or sensory deficit. She exhibits normal muscle tone. Coordination normal.   Skin: No rash noted. No erythema.   Vitals reviewed.      Right Side Rheumatological Exam     The patient is tender to palpation of the shoulder, elbow, wrist, knee, 1st PIP, 1st MCP, 2nd PIP, 2nd MCP, 3rd PIP, 3rd MCP, 4th PIP, 4th MCP, 5th PIP, hip, ankle, 1st MTP, 2nd MTP, 3rd MTP, 4th MTP, 5th MTP, 1st toe IP, 2nd toe IP, 3rd toe IP, 4th toe IP and 5th toe IP    Left Side Rheumatological Exam     The patient is tender to palpation of the shoulder, elbow, wrist, knee, 1st PIP, 1st MCP, 2nd PIP, 2nd MCP, 3rd PIP, 3rd MCP, 4th PIP, 4th MCP, 5th PIP, 5th MCP, hip, ankle, 1st MTP, 2nd MTP, 3rd MTP, 4th MTP, 5th MTP, 1st toe IP, 2nd toe IP, 3rd toe IP, 4th toe IP and 5th toe IP.       Completed Fibromyalgia exam 18/18 tender points.  No data to display     Assessment:       1. Systemic lupus erythematosus, organ or system involvement unspecified    2. Gastroesophageal reflux disease, unspecified whether esophagitis present    3. Fibromyalgia syndrome    4. Rotator cuff syndrome of right shoulder    5. Primary insomnia    6.  Chronic obstructive pulmonary disease, unspecified COPD type    7. Bipolar affective disorder, remission status unspecified    8. Disc degeneration, lumbar          Medication List with Changes/Refills   New Medications    FOLIC ACID (FOLVITE) 1 MG TABLET    Take 1 tablet (1 mg total) by mouth once daily. After food       Start Date: 4/10/2023 End Date: 10/7/2023    METHOTREXATE 2.5 MG TAB    Take 4 tablets (10 mg total) by mouth every 7 days. EVERY        TAKE 4 TAB TOGETHER AFTER SUPPER       Start Date: 4/10/2023 End Date: 10/7/2023    OMEPRAZOLE (PRILOSEC) 40 MG CAPSULE    Take 1 capsule (40 mg total) by mouth every morning. Before breakfast       Start Date: 4/10/2023 End Date: 4/9/2024   Current Medications    ALBUTEROL (PROVENTIL) 2.5 MG /3 ML (0.083 %) NEBULIZER SOLUTION    Take 2.5 mg by nebulization daily as needed.        Start Date: 11/19/2015End Date: --    ALLOPURINOL (ZYLOPRIM) 300 MG TABLET    Take 1 tablet (300 mg total) by mouth once daily.       Start Date: 7/29/2022 End Date: --    BENAZEPRIL (LOTENSIN) 40 MG TABLET    Take 40 mg by mouth once daily.       Start Date: 9/10/2022 End Date: --    BUPROPION (WELLBUTRIN SR) 150 MG TBSR 12 HR TABLET    Take 150 mg by mouth 2 (two) times daily.       Start Date: 3/23/2023 End Date: --    DENOSUMAB (PROLIA) 60 MG/ML SYRG    Inject 1 mL (60 mg total) into the skin every 6 (six) months.       Start Date: 7/29/2022 End Date: --    DEXILANT 60 MG CAPSULE    Take 60 mg by mouth once daily.        Start Date: 2/4/2016  End Date: --    FERROUS SULFATE (FEOSOL) 325 MG (65 MG IRON) TAB TABLET    Take 325 mg by mouth once daily.       Start Date: 4/22/2022 End Date: --    FLUTICASONE (FLONASE) 50 MCG/ACTUATION NASAL SPRAY    1 spray by Nasal route 2 (two) times daily.        Start Date: 12/17/2015End Date: --    IPRATROPIUM (ATROVENT) 42 MCG (0.06 %) NASAL SPRAY    1 spray 2 (two) times daily.       Start Date: 3/2/2022  End Date: --    LATUDA 60 MG TAB TABLET     once daily.        Start Date: 5/22/2016 End Date: --    LEVOCETIRIZINE (XYZAL) 5 MG TABLET    Take 5 mg by mouth once daily.       Start Date: 5/4/2022  End Date: --    LEVOTHYROXINE (SYNTHROID) 112 MCG TABLET    Take 112 mcg by mouth before breakfast.        Start Date: 2/18/2016 End Date: --    METFORMIN (GLUCOPHAGE) 500 MG TABLET    Take 250 mg by mouth 2 (two) times daily with meals.       Start Date: 2/8/2016  End Date: --    MONTELUKAST (SINGULAIR) 10 MG TABLET    Take 10 mg by mouth once daily.        Start Date: 1/29/2016 End Date: --    NUVIGIL 250 MG TABLET    Take 250 mg by mouth every morning.       Start Date: 5/16/2016 End Date: --    OMEGA-3 FATTY ACIDS/FISH OIL (FISH OIL-OMEGA-3 FATTY ACIDS) 300-1,000 MG CAPSULE    Take 2 g by mouth once daily at 6am.       Start Date: --        End Date: --    ONETOUCH DELICA LANCETS 33 GAUGE MISC    1 lancet 2 (two) times a day.       Start Date: 12/23/2015End Date: --    ONETOUCH VERIO STRP           Start Date: 1/26/2016 End Date: --    PATADAY 0.2 % DROP    daily as needed.       Start Date: 2/7/2016  End Date: --    RESTASIS 0.05 % OPHTHALMIC EMULSION    Place 1 drop into both eyes 2 (two) times daily.       Start Date: 7/29/2022 End Date: --    ROSUVASTATIN (CRESTOR) 5 MG TABLET    Take 5 mg by mouth once daily.       Start Date: 2/22/2022 End Date: --    TRELEGY ELLIPTA 200-62.5-25 MCG INHALER    Inhale 1 puff into the lungs once daily.       Start Date: 11/7/2022 End Date: --    TRIAMTERENE-HYDROCHLOROTHIAZIDE 37.5-25 MG (DYAZIDE) 37.5-25 MG PER CAPSULE    Take 1 capsule by mouth once daily.       Start Date: 2/8/2016  End Date: --    TRULICITY 0.75 MG/0.5 ML PEN INJECTOR    Inject 0.75 mg into the skin every 7 days.       Start Date: 11/22/2022End Date: --    VENLAFAXINE (EFFEXOR-XR) 75 MG 24 HR CAPSULE    Take 75 mg by mouth once daily.       Start Date: 5/8/2016  End Date: --    VENTOLIN HFA 90 MCG/ACTUATION INHALER    1 puff daily as needed.       Start  Date: 1/1/2016  End Date: --   Changed and/or Refilled Medications    Modified Medication Previous Medication    CLONAZEPAM (KLONOPIN) 0.5 MG TABLET clonazePAM (KLONOPIN) 0.5 MG tablet       Take 1 tablet (0.5 mg total) by mouth nightly as needed for Anxiety.    Take 1 tablet (0.5 mg total) by mouth nightly as needed for Anxiety.       Start Date: 4/10/2023 End Date: --    Start Date: 7/29/2022 End Date: 4/10/2023    HYDROXYCHLOROQUINE (PLAQUENIL) 200 MG TABLET hydrOXYchloroQUINE (PLAQUENIL) 200 mg tablet       TAKE 1 TABLET BY MOUTH TWICE DAILY WITH FOOD OR MILK    TAKE 1 TABLET BY MOUTH TWICE DAILY WITH FOOD OR MILK       Start Date: 4/10/2023 End Date: --    Start Date: 12/1/2022 End Date: 4/10/2023    LYRICA 150 MG CAPSULE LYRICA 150 mg capsule       Take 1 capsule (150 mg total) by mouth 3 (three) times daily.    Take 1 capsule (150 mg total) by mouth 3 (three) times daily.       Start Date: 4/10/2023 End Date: --    Start Date: 12/1/2022 End Date: 4/10/2023    MELOXICAM (MOBIC) 15 MG TABLET meloxicam (MOBIC) 15 MG tablet       Take 1 tablet (15 mg total) by mouth once daily. After breakfast    Take 1 tablet (15 mg total) by mouth once daily. After lunch       Start Date: 4/10/2023 End Date: 10/7/2023    Start Date: 12/1/2022 End Date: 4/10/2023    TIZANIDINE (ZANAFLEX) 4 MG TABLET tiZANidine (ZANAFLEX) 4 MG tablet       Take 2 tablets (8 mg total) by mouth nightly.    Take 2 tablets (8 mg total) by mouth nightly.       Start Date: 4/10/2023 End Date: --    Start Date: 12/1/2022 End Date: 4/10/2023   Discontinued Medications    LIDOCAINE (LIDODERM) 5 %    Place 1 patch onto the skin once daily.       Start Date: 6/28/2022 End Date: 4/10/2023    TRIAMCINOLONE ACETONIDE 0.1% (KENALOG) 0.1 % CREAM    Apply topically 2 (two) times daily.       Start Date: 7/1/2022  End Date: 4/10/2023         Plan:         Problem List Items Addressed This Visit          Neuro    Disc degeneration, lumbar    Relevant Medications     clonazePAM (KLONOPIN) 0.5 MG tablet    hydrOXYchloroQUINE (PLAQUENIL) 200 mg tablet    LYRICA 150 mg capsule    meloxicam (MOBIC) 15 MG tablet    tiZANidine (ZANAFLEX) 4 MG tablet    folic acid (FOLVITE) 1 MG tablet    methotrexate 2.5 MG Tab    omeprazole (PRILOSEC) 40 MG capsule       Psychiatric    Bipolar disorder    Relevant Medications    clonazePAM (KLONOPIN) 0.5 MG tablet    hydrOXYchloroQUINE (PLAQUENIL) 200 mg tablet    LYRICA 150 mg capsule    meloxicam (MOBIC) 15 MG tablet    tiZANidine (ZANAFLEX) 4 MG tablet    folic acid (FOLVITE) 1 MG tablet    methotrexate 2.5 MG Tab    omeprazole (PRILOSEC) 40 MG capsule       Pulmonary    Chronic obstructive pulmonary disease    Relevant Medications    clonazePAM (KLONOPIN) 0.5 MG tablet    hydrOXYchloroQUINE (PLAQUENIL) 200 mg tablet    LYRICA 150 mg capsule    meloxicam (MOBIC) 15 MG tablet    tiZANidine (ZANAFLEX) 4 MG tablet    folic acid (FOLVITE) 1 MG tablet    methotrexate 2.5 MG Tab    omeprazole (PRILOSEC) 40 MG capsule       Immunology/Multi System    Systemic lupus erythematosus, organ or system involvement unspecified - Primary    Relevant Medications    clonazePAM (KLONOPIN) 0.5 MG tablet    hydrOXYchloroQUINE (PLAQUENIL) 200 mg tablet    LYRICA 150 mg capsule    meloxicam (MOBIC) 15 MG tablet    tiZANidine (ZANAFLEX) 4 MG tablet    folic acid (FOLVITE) 1 MG tablet    methotrexate 2.5 MG Tab    omeprazole (PRILOSEC) 40 MG capsule       GI    Gastroesophageal reflux disease    Relevant Medications    clonazePAM (KLONOPIN) 0.5 MG tablet    hydrOXYchloroQUINE (PLAQUENIL) 200 mg tablet    LYRICA 150 mg capsule    meloxicam (MOBIC) 15 MG tablet    tiZANidine (ZANAFLEX) 4 MG tablet    folic acid (FOLVITE) 1 MG tablet    methotrexate 2.5 MG Tab    omeprazole (PRILOSEC) 40 MG capsule       Orthopedic    Fibromyalgia syndrome    Relevant Medications    clonazePAM (KLONOPIN) 0.5 MG tablet    hydrOXYchloroQUINE (PLAQUENIL) 200 mg tablet    LYRICA 150 mg capsule     meloxicam (MOBIC) 15 MG tablet    tiZANidine (ZANAFLEX) 4 MG tablet    folic acid (FOLVITE) 1 MG tablet    methotrexate 2.5 MG Tab    omeprazole (PRILOSEC) 40 MG capsule    Rotator cuff syndrome of right shoulder    Relevant Medications    clonazePAM (KLONOPIN) 0.5 MG tablet    hydrOXYchloroQUINE (PLAQUENIL) 200 mg tablet    LYRICA 150 mg capsule    meloxicam (MOBIC) 15 MG tablet    tiZANidine (ZANAFLEX) 4 MG tablet    folic acid (FOLVITE) 1 MG tablet    methotrexate 2.5 MG Tab    omeprazole (PRILOSEC) 40 MG capsule       Other    Primary insomnia    Relevant Medications    clonazePAM (KLONOPIN) 0.5 MG tablet    hydrOXYchloroQUINE (PLAQUENIL) 200 mg tablet    LYRICA 150 mg capsule    meloxicam (MOBIC) 15 MG tablet    tiZANidine (ZANAFLEX) 4 MG tablet    folic acid (FOLVITE) 1 MG tablet    methotrexate 2.5 MG Tab    omeprazole (PRILOSEC) 40 MG capsule

## 2023-08-06 DIAGNOSIS — M79.7 FIBROMYALGIA SYNDROME: ICD-10-CM

## 2023-08-06 DIAGNOSIS — M32.19 OTHER SYSTEMIC LUPUS ERYTHEMATOSUS WITH OTHER ORGAN INVOLVEMENT: ICD-10-CM

## 2023-08-06 DIAGNOSIS — M51.36 DISC DEGENERATION, LUMBAR: ICD-10-CM

## 2023-08-06 DIAGNOSIS — M75.101 ROTATOR CUFF SYNDROME OF RIGHT SHOULDER: ICD-10-CM

## 2023-08-06 DIAGNOSIS — F51.01 PRIMARY INSOMNIA: ICD-10-CM

## 2023-08-07 RX ORDER — CYCLOSPORINE 0.5 MG/ML
1 EMULSION OPHTHALMIC 2 TIMES DAILY
Qty: 1 EACH | Refills: 3 | Status: SHIPPED | OUTPATIENT
Start: 2023-08-07

## 2023-08-18 ENCOUNTER — OFFICE VISIT (OUTPATIENT)
Dept: RHEUMATOLOGY | Facility: CLINIC | Age: 58
End: 2023-08-18
Payer: COMMERCIAL

## 2023-08-18 VITALS
HEIGHT: 63 IN | BODY MASS INDEX: 39.45 KG/M2 | RESPIRATION RATE: 18 BRPM | SYSTOLIC BLOOD PRESSURE: 107 MMHG | WEIGHT: 222.63 LBS | DIASTOLIC BLOOD PRESSURE: 73 MMHG | HEART RATE: 99 BPM

## 2023-08-18 DIAGNOSIS — M51.36 DISC DEGENERATION, LUMBAR: ICD-10-CM

## 2023-08-18 DIAGNOSIS — J44.9 CHRONIC OBSTRUCTIVE PULMONARY DISEASE, UNSPECIFIED COPD TYPE: ICD-10-CM

## 2023-08-18 DIAGNOSIS — F31.9 BIPOLAR AFFECTIVE DISORDER, REMISSION STATUS UNSPECIFIED: ICD-10-CM

## 2023-08-18 DIAGNOSIS — M67.912 DISORDER OF ROTATOR CUFF, LEFT: ICD-10-CM

## 2023-08-18 DIAGNOSIS — M79.7 FIBROMYALGIA SYNDROME: ICD-10-CM

## 2023-08-18 DIAGNOSIS — M75.101 ROTATOR CUFF SYNDROME OF RIGHT SHOULDER: ICD-10-CM

## 2023-08-18 DIAGNOSIS — M32.10 SYSTEMIC LUPUS ERYTHEMATOSUS, ORGAN OR SYSTEM INVOLVEMENT UNSPECIFIED: ICD-10-CM

## 2023-08-18 DIAGNOSIS — M06.00 SERONEGATIVE RHEUMATOID ARTHRITIS: Primary | ICD-10-CM

## 2023-08-18 DIAGNOSIS — K21.9 GASTROESOPHAGEAL REFLUX DISEASE, UNSPECIFIED WHETHER ESOPHAGITIS PRESENT: ICD-10-CM

## 2023-08-18 DIAGNOSIS — F51.01 PRIMARY INSOMNIA: ICD-10-CM

## 2023-08-18 PROCEDURE — 1160F RVW MEDS BY RX/DR IN RCRD: CPT | Mod: CPTII,S$GLB,, | Performed by: INTERNAL MEDICINE

## 2023-08-18 PROCEDURE — 3074F SYST BP LT 130 MM HG: CPT | Mod: CPTII,S$GLB,, | Performed by: INTERNAL MEDICINE

## 2023-08-18 PROCEDURE — 3008F PR BODY MASS INDEX (BMI) DOCUMENTED: ICD-10-PCS | Mod: CPTII,S$GLB,, | Performed by: INTERNAL MEDICINE

## 2023-08-18 PROCEDURE — 3078F PR MOST RECENT DIASTOLIC BLOOD PRESSURE < 80 MM HG: ICD-10-PCS | Mod: CPTII,S$GLB,, | Performed by: INTERNAL MEDICINE

## 2023-08-18 PROCEDURE — 4010F PR ACE/ARB THEARPY RXD/TAKEN: ICD-10-PCS | Mod: CPTII,S$GLB,, | Performed by: INTERNAL MEDICINE

## 2023-08-18 PROCEDURE — 99999 PR PBB SHADOW E&M-EST. PATIENT-LVL V: ICD-10-PCS | Mod: PBBFAC,,, | Performed by: INTERNAL MEDICINE

## 2023-08-18 PROCEDURE — 4010F ACE/ARB THERAPY RXD/TAKEN: CPT | Mod: CPTII,S$GLB,, | Performed by: INTERNAL MEDICINE

## 2023-08-18 PROCEDURE — 3074F PR MOST RECENT SYSTOLIC BLOOD PRESSURE < 130 MM HG: ICD-10-PCS | Mod: CPTII,S$GLB,, | Performed by: INTERNAL MEDICINE

## 2023-08-18 PROCEDURE — 3078F DIAST BP <80 MM HG: CPT | Mod: CPTII,S$GLB,, | Performed by: INTERNAL MEDICINE

## 2023-08-18 PROCEDURE — 3008F BODY MASS INDEX DOCD: CPT | Mod: CPTII,S$GLB,, | Performed by: INTERNAL MEDICINE

## 2023-08-18 PROCEDURE — 1159F PR MEDICATION LIST DOCUMENTED IN MEDICAL RECORD: ICD-10-PCS | Mod: CPTII,S$GLB,, | Performed by: INTERNAL MEDICINE

## 2023-08-18 PROCEDURE — 1159F MED LIST DOCD IN RCRD: CPT | Mod: CPTII,S$GLB,, | Performed by: INTERNAL MEDICINE

## 2023-08-18 PROCEDURE — 99215 PR OFFICE/OUTPT VISIT, EST, LEVL V, 40-54 MIN: ICD-10-PCS | Mod: S$GLB,,, | Performed by: INTERNAL MEDICINE

## 2023-08-18 PROCEDURE — 99215 OFFICE O/P EST HI 40 MIN: CPT | Mod: S$GLB,,, | Performed by: INTERNAL MEDICINE

## 2023-08-18 PROCEDURE — 99999 PR PBB SHADOW E&M-EST. PATIENT-LVL V: CPT | Mod: PBBFAC,,, | Performed by: INTERNAL MEDICINE

## 2023-08-18 PROCEDURE — 1160F PR REVIEW ALL MEDS BY PRESCRIBER/CLIN PHARMACIST DOCUMENTED: ICD-10-PCS | Mod: CPTII,S$GLB,, | Performed by: INTERNAL MEDICINE

## 2023-08-18 RX ORDER — ONDANSETRON 8 MG/1
8 TABLET, ORALLY DISINTEGRATING ORAL 2 TIMES DAILY PRN
COMMUNITY
Start: 2023-06-15

## 2023-08-18 RX ORDER — TIRZEPATIDE 2.5 MG/.5ML
INJECTION, SOLUTION SUBCUTANEOUS
COMMUNITY
Start: 2023-08-07

## 2023-08-18 RX ORDER — FAMOTIDINE 40 MG/1
40 TABLET, FILM COATED ORAL 2 TIMES DAILY
COMMUNITY
Start: 2023-08-14

## 2023-08-18 RX ORDER — TIZANIDINE 4 MG/1
8 TABLET ORAL NIGHTLY
Qty: 180 TABLET | Refills: 3 | Status: SHIPPED | OUTPATIENT
Start: 2023-08-18

## 2023-08-18 RX ORDER — SODIUM, POTASSIUM,MAG SULFATES 17.5-3.13G
1 SOLUTION, RECONSTITUTED, ORAL ORAL DAILY
COMMUNITY
Start: 2023-08-10

## 2023-08-18 RX ORDER — OMEPRAZOLE 40 MG/1
40 CAPSULE, DELAYED RELEASE ORAL EVERY MORNING
Qty: 90 CAPSULE | Refills: 3 | Status: SHIPPED | OUTPATIENT
Start: 2023-08-18 | End: 2024-08-17

## 2023-08-18 RX ORDER — HYDROXYCHLOROQUINE SULFATE 200 MG/1
TABLET, FILM COATED ORAL
Qty: 180 TABLET | Refills: 3 | Status: SHIPPED | OUTPATIENT
Start: 2023-08-18

## 2023-08-18 RX ORDER — MELOXICAM 15 MG/1
15 TABLET ORAL DAILY
Qty: 90 TABLET | Refills: 5 | Status: SHIPPED | OUTPATIENT
Start: 2023-08-18 | End: 2025-02-08

## 2023-08-18 RX ORDER — PREGABALIN 150 MG/1
150 CAPSULE ORAL 3 TIMES DAILY
Qty: 270 CAPSULE | Refills: 3 | Status: SHIPPED | OUTPATIENT
Start: 2023-08-18 | End: 2023-08-22

## 2023-08-18 RX ORDER — SUCRALFATE 1 G/1
1 TABLET ORAL 3 TIMES DAILY
COMMUNITY
Start: 2023-06-15

## 2023-08-18 RX ORDER — MECOBAL/LEVOMEFOLAT CA/B6 PHOS 2-3-35 MG
1 TABLET ORAL 2 TIMES DAILY
COMMUNITY
Start: 2023-07-21

## 2023-08-18 RX ORDER — ADALIMUMAB 40MG/0.4ML
40 KIT SUBCUTANEOUS
Qty: 2 PEN | Refills: 11 | Status: SHIPPED | OUTPATIENT
Start: 2023-08-18 | End: 2024-07-19

## 2023-08-18 RX ORDER — HYDROCODONE BITARTRATE AND ACETAMINOPHEN 5; 325 MG/1; MG/1
1 TABLET ORAL EVERY 6 HOURS PRN
COMMUNITY
Start: 2023-05-18

## 2023-08-18 RX ORDER — LACTULOSE 10 G/15ML
SOLUTION ORAL; RECTAL
COMMUNITY
Start: 2023-06-15

## 2023-08-18 RX ORDER — SOD SULF/POT CHLORIDE/MAG SULF 1.479 G
TABLET ORAL
COMMUNITY
Start: 2023-08-12

## 2023-08-18 RX ORDER — METHOCARBAMOL 500 MG/1
500 TABLET, FILM COATED ORAL 2 TIMES DAILY
COMMUNITY
Start: 2023-05-18 | End: 2023-08-18

## 2023-08-18 NOTE — PROGRESS NOTES
"Subjective:       Patient ID: Talia Salas is a 58 y.o. female.    Chief Complaint: Disease Management (4 month follow up )    The patient is complaining of joint pain involving the MCP PIP wrist elbow shoulders hips knees and ankles bilaterally.  The pain is 8/10 in intensity dull in quality and continuous.  That is associated with a morning stiffness lasting for more than 60 minutes.  Is also having difficulty maintaining a good night of sleep.  This has been associated with myalgias.  Muscle aches are 7/10 in intensity dull in quality and continuous.  They are associated with fatigue.  No fever no chills no others.  Labs checked during this visit   RHEUMATOID ARTHRITIS RESISTANT TO METHOTREXATE PLAQUENIL STEROIDS AND NSAIDS      Review of Systems   Constitutional:  Negative for appetite change, chills and fever.   HENT:  Negative for congestion, ear pain, mouth sores, nosebleeds and trouble swallowing.    Eyes:  Negative for photophobia and discharge.   Respiratory:  Negative for chest tightness and shortness of breath.    Cardiovascular:  Negative for chest pain.   Gastrointestinal:  Negative for abdominal pain and vomiting.   Endocrine: Negative.    Genitourinary:  Negative for hematuria.   Musculoskeletal:         As per HPI   Skin:  Negative for rash.   Neurological:  Negative for weakness.         Objective:   /73 (BP Location: Right arm, Patient Position: Sitting, BP Method: Medium (Automatic))   Pulse 99   Resp 18   Ht 5' 3" (1.6 m)   Wt 101 kg (222 lb 9.6 oz)   BMI 39.43 kg/m²      Physical Exam   Constitutional: She is oriented to person, place, and time. She appears well-developed and well-nourished. No distress.   HENT:   Head: Normocephalic and atraumatic.   Right Ear: External ear normal.   Left Ear: External ear normal.   Eyes: Pupils are equal, round, and reactive to light.   Cardiovascular: Normal rate, regular rhythm and normal heart sounds.   Pulmonary/Chest: Breath sounds " normal.   Abdominal: Soft. There is no abdominal tenderness.   Musculoskeletal:      Right shoulder: Tenderness present.      Left shoulder: Tenderness present.      Right elbow: Tenderness present.      Left elbow: Tenderness present.      Right wrist: Tenderness present.      Left wrist: Tenderness present.      Cervical back: Neck supple.      Right hip: Tenderness present.      Left hip: Tenderness present.      Right knee: Tenderness present.      Left knee: Tenderness present.      Right ankle: Tenderness present.      Left ankle: Tenderness present.   Lymphadenopathy:     She has no cervical adenopathy.   Neurological: She is alert and oriented to person, place, and time. She displays normal reflexes. No cranial nerve deficit or sensory deficit. She exhibits normal muscle tone. Coordination normal.   Skin: No rash noted. No erythema.   Vitals reviewed.      Right Side Rheumatological Exam     The patient is tender to palpation of the shoulder, elbow, wrist, knee, 1st PIP, 1st MCP, 2nd PIP, 2nd MCP, 3rd PIP, 3rd MCP, 4th PIP, 4th MCP, 5th PIP, hip, ankle, 1st MTP, 2nd MTP, 3rd MTP, 4th MTP, 5th MTP, 1st toe IP, 2nd toe IP, 3rd toe IP, 4th toe IP and 5th toe IP    Left Side Rheumatological Exam     The patient is tender to palpation of the shoulder, elbow, wrist, knee, 1st PIP, 1st MCP, 2nd PIP, 2nd MCP, 3rd PIP, 3rd MCP, 4th PIP, 4th MCP, 5th PIP, 5th MCP, hip, ankle, 1st MTP, 2nd MTP, 3rd MTP, 4th MTP, 5th MTP, 1st toe IP, 2nd toe IP, 3rd toe IP, 4th toe IP and 5th toe IP.         Completed Fibromyalgia exam 18/18 tender points.  No data to display     Assessment:       1. Seronegative rheumatoid arthritis    2. Systemic lupus erythematosus, organ or system involvement unspecified    3. Gastroesophageal reflux disease, unspecified whether esophagitis present    4. Fibromyalgia syndrome    5. Rotator cuff syndrome of right shoulder    6. Primary insomnia    7. Chronic obstructive pulmonary disease, unspecified  COPD type    8. Bipolar affective disorder, remission status unspecified    9. Disc degeneration, lumbar    10. Disorder of rotator cuff, left          Medication List with Changes/Refills   New Medications    ADALIMUMAB (HUMIRA,CF, PEN) 40 MG/0.4 ML PNKT    Inject 0.4 mLs (40 mg total) into the skin every 14 (fourteen) days.       Start Date: 8/18/2023 End Date: 7/19/2024   Current Medications    ALBUTEROL (PROVENTIL) 2.5 MG /3 ML (0.083 %) NEBULIZER SOLUTION    Take 2.5 mg by nebulization daily as needed.        Start Date: 11/19/2015End Date: --    ALLOPURINOL (ZYLOPRIM) 300 MG TABLET    Take 1 tablet (300 mg total) by mouth once daily.       Start Date: 7/29/2022 End Date: --    BENAZEPRIL (LOTENSIN) 40 MG TABLET    Take 40 mg by mouth once daily.       Start Date: 9/10/2022 End Date: --    BUPROPION (WELLBUTRIN SR) 150 MG TBSR 12 HR TABLET    Take 150 mg by mouth 2 (two) times daily.       Start Date: 3/23/2023 End Date: --    CLONAZEPAM (KLONOPIN) 0.5 MG TABLET    Take 1 tablet (0.5 mg total) by mouth nightly as needed for Anxiety.       Start Date: 4/10/2023 End Date: --    DENOSUMAB (PROLIA) 60 MG/ML SYRG    Inject 1 mL (60 mg total) into the skin every 6 (six) months.       Start Date: 7/29/2022 End Date: --    DEXILANT 60 MG CAPSULE    Take 60 mg by mouth once daily.        Start Date: 2/4/2016  End Date: --    FAMOTIDINE (PEPCID) 40 MG TABLET    Take 40 mg by mouth 2 (two) times daily.       Start Date: 8/14/2023 End Date: --    FERROUS SULFATE (FEOSOL) 325 MG (65 MG IRON) TAB TABLET    Take 325 mg by mouth once daily.       Start Date: 4/22/2022 End Date: --    FLUTICASONE (FLONASE) 50 MCG/ACTUATION NASAL SPRAY    1 spray by Nasal route 2 (two) times daily.        Start Date: 12/17/2015End Date: --    FLUTICASONE/VILANTEROL (BREO ELLIPTA INHL)    Breo Ellipta Take No date recorded No form recorded No frequency recorded No route recorded No set duration recorded No set duration amount recorded active No  dosage strength recorded No dosage strength units of measure recorded       Start Date: --        End Date: --    FOLTANX 3-35-2 MG TAB    Take 1 tablet by mouth 2 (two) times daily.       Start Date: 2023 End Date: --    HYDROCODONE-ACETAMINOPHEN (NORCO) 5-325 MG PER TABLET    Take 1 tablet by mouth every 6 (six) hours as needed.       Start Date: 2023 End Date: --    IPRATROPIUM (ATROVENT) 42 MCG (0.06 %) NASAL SPRAY    1 spray 2 (two) times daily.       Start Date: 3/2/2022  End Date: --    LACTULOSE (CHRONULAC) 10 GRAM/15 ML SOLUTION    SMARTSI Milliliter(s) By Mouth Twice Daily PRN       Start Date: 6/15/2023 End Date: --    LATUDA 60 MG TAB TABLET    once daily.        Start Date: 2016 End Date: --    LEVOCETIRIZINE (XYZAL) 5 MG TABLET    Take 5 mg by mouth once daily.       Start Date: 2022  End Date: --    LEVOTHYROXINE (SYNTHROID) 112 MCG TABLET    Take 112 mcg by mouth before breakfast.        Start Date: 2016 End Date: --    METFORMIN (GLUCOPHAGE) 500 MG TABLET    Take 250 mg by mouth 2 (two) times daily with meals.       Start Date: 2016  End Date: --    MONTELUKAST (SINGULAIR) 10 MG TABLET    Take 10 mg by mouth once daily.        Start Date: 2016 End Date: --    MOUNJARO 2.5 MG/0.5 ML PNIJ    SMARTSI.5 Milligram(s) SUB-Q Once a Week       Start Date: 2023  End Date: --    NUVIGIL 250 MG TABLET    Take 250 mg by mouth every morning.       Start Date: 2016 End Date: --    OMEGA-3 FATTY ACIDS/FISH OIL (FISH OIL-OMEGA-3 FATTY ACIDS) 300-1,000 MG CAPSULE    Take 2 g by mouth once daily at 6am.       Start Date: --        End Date: --    ONDANSETRON (ZOFRAN-ODT) 8 MG TBDL    Take 8 mg by mouth 2 (two) times daily as needed.       Start Date: 6/15/2023 End Date: --    CHARITY JONES LANCETS 33 GAUGE MISC    1 lancet 2 (two) times a day.       Start Date: 2015End Date: --    ONETOUCH VERIO STRP           Start Date: 2016 End Date: --    PATADAY 0.2 %  DROP    daily as needed.       Start Date: 2/7/2016  End Date: --    RESTASIS 0.05 % OPHTHALMIC EMULSION    INSTILL 1 DROP INTO BOTH EYES TWICE A DAY       Start Date: 8/7/2023  End Date: --    ROSUVASTATIN (CRESTOR) 5 MG TABLET    Take 5 mg by mouth once daily.       Start Date: 2/22/2022 End Date: --    SODIUM,POTASSIUM,MAG SULFATES (SUPREP BOWEL PREP KIT) 17.5-3.13-1.6 GRAM SOLR    Take 1 kit by mouth once daily.       Start Date: 8/10/2023 End Date: --    SUCRALFATE (CARAFATE) 1 GRAM TABLET    Take 1 g by mouth 3 (three) times daily.       Start Date: 6/15/2023 End Date: --    SUTAB 1.479-0.188- 0.225 GRAM TABLET    Take by mouth.       Start Date: 8/12/2023 End Date: --    TRELEGY ELLIPTA 200-62.5-25 MCG INHALER    Inhale 1 puff into the lungs once daily.       Start Date: 11/7/2022 End Date: --    TRIAMTERENE-HYDROCHLOROTHIAZIDE 37.5-25 MG (DYAZIDE) 37.5-25 MG PER CAPSULE    Take 1 capsule by mouth once daily.       Start Date: 2/8/2016  End Date: --    TRULICITY 0.75 MG/0.5 ML PEN INJECTOR    Inject 0.75 mg into the skin every 7 days.       Start Date: 11/22/2022End Date: --    VENLAFAXINE (EFFEXOR-XR) 75 MG 24 HR CAPSULE    Take 75 mg by mouth once daily.       Start Date: 5/8/2016  End Date: --    VENTOLIN HFA 90 MCG/ACTUATION INHALER    1 puff daily as needed.       Start Date: 1/1/2016  End Date: --   Changed and/or Refilled Medications    Modified Medication Previous Medication    HYDROXYCHLOROQUINE (PLAQUENIL) 200 MG TABLET hydrOXYchloroQUINE (PLAQUENIL) 200 mg tablet       TAKE 1 TABLET BY MOUTH TWICE DAILY WITH FOOD OR MILK    TAKE 1 TABLET BY MOUTH TWICE DAILY WITH FOOD OR MILK       Start Date: 8/18/2023 End Date: --    Start Date: 4/10/2023 End Date: 8/18/2023    LYRICA 150 MG CAPSULE LYRICA 150 mg capsule       Take 1 capsule (150 mg total) by mouth 3 (three) times daily.    Take 1 capsule (150 mg total) by mouth 3 (three) times daily.       Start Date: 8/18/2023 End Date: --    Start Date:  4/10/2023 End Date: 8/18/2023    MELOXICAM (MOBIC) 15 MG TABLET meloxicam (MOBIC) 15 MG tablet       Take 1 tablet (15 mg total) by mouth once daily. After breakfast    Take 1 tablet (15 mg total) by mouth once daily. After breakfast       Start Date: 8/18/2023 End Date: 2/8/2025    Start Date: 4/10/2023 End Date: 8/18/2023    OMEPRAZOLE (PRILOSEC) 40 MG CAPSULE omeprazole (PRILOSEC) 40 MG capsule       Take 1 capsule (40 mg total) by mouth every morning. Before breakfast    Take 1 capsule (40 mg total) by mouth every morning. Before breakfast       Start Date: 8/18/2023 End Date: 8/17/2024    Start Date: 4/10/2023 End Date: 8/18/2023    TIZANIDINE (ZANAFLEX) 4 MG TABLET tiZANidine (ZANAFLEX) 4 MG tablet       Take 2 tablets (8 mg total) by mouth nightly.    Take 2 tablets (8 mg total) by mouth nightly.       Start Date: 8/18/2023 End Date: --    Start Date: 4/10/2023 End Date: 8/18/2023   Discontinued Medications    FOLIC ACID (FOLVITE) 1 MG TABLET    Take 1 tablet (1 mg total) by mouth once daily. After food       Start Date: 4/10/2023 End Date: 8/18/2023    METHOCARBAMOL (ROBAXIN) 500 MG TAB    Take 500 mg by mouth 2 (two) times daily.       Start Date: 5/18/2023 End Date: 8/18/2023    METHOTREXATE 2.5 MG TAB    Take 4 tablets (10 mg total) by mouth every 7 days. EVERY        TAKE 4 TAB TOGETHER AFTER SUPPER       Start Date: 4/10/2023 End Date: 8/18/2023         Plan:         Problem List Items Addressed This Visit          Neuro    Disc degeneration, lumbar    Relevant Medications    hydrOXYchloroQUINE (PLAQUENIL) 200 mg tablet    LYRICA 150 mg capsule    meloxicam (MOBIC) 15 MG tablet    omeprazole (PRILOSEC) 40 MG capsule    tiZANidine (ZANAFLEX) 4 MG tablet    Other Relevant Orders    Quantiferon Gold TB    CBC Auto Differential    Comprehensive Metabolic Panel    CRP, High Sensitivity    Vitamin D    Rheumatoid Quantitative    Cyclic Citrullinated Peptide Antibody, IgG    Antinuclear Ab, HEp-2 Substrate     Hepatitis B Surface Antigen    Hepatitis C Antibody    TSH    T4, Free       Psychiatric    Bipolar disorder    Relevant Medications    hydrOXYchloroQUINE (PLAQUENIL) 200 mg tablet    LYRICA 150 mg capsule    meloxicam (MOBIC) 15 MG tablet    omeprazole (PRILOSEC) 40 MG capsule    tiZANidine (ZANAFLEX) 4 MG tablet    Other Relevant Orders    Quantiferon Gold TB    CBC Auto Differential    Comprehensive Metabolic Panel    CRP, High Sensitivity    Vitamin D    Rheumatoid Quantitative    Cyclic Citrullinated Peptide Antibody, IgG    Antinuclear Ab, HEp-2 Substrate    Hepatitis B Surface Antigen    Hepatitis C Antibody    TSH    T4, Free       Pulmonary    Chronic obstructive pulmonary disease    Relevant Medications    hydrOXYchloroQUINE (PLAQUENIL) 200 mg tablet    LYRICA 150 mg capsule    meloxicam (MOBIC) 15 MG tablet    omeprazole (PRILOSEC) 40 MG capsule    tiZANidine (ZANAFLEX) 4 MG tablet    Other Relevant Orders    Quantiferon Gold TB    CBC Auto Differential    Comprehensive Metabolic Panel    CRP, High Sensitivity    Vitamin D    Rheumatoid Quantitative    Cyclic Citrullinated Peptide Antibody, IgG    Antinuclear Ab, HEp-2 Substrate    Hepatitis B Surface Antigen    Hepatitis C Antibody    TSH    T4, Free       Immunology/Multi System    Systemic lupus erythematosus, organ or system involvement unspecified    Relevant Medications    hydrOXYchloroQUINE (PLAQUENIL) 200 mg tablet    LYRICA 150 mg capsule    meloxicam (MOBIC) 15 MG tablet    omeprazole (PRILOSEC) 40 MG capsule    tiZANidine (ZANAFLEX) 4 MG tablet    Other Relevant Orders    Quantiferon Gold TB    CBC Auto Differential    Comprehensive Metabolic Panel    CRP, High Sensitivity    Vitamin D    Rheumatoid Quantitative    Cyclic Citrullinated Peptide Antibody, IgG    Antinuclear Ab, HEp-2 Substrate    Hepatitis B Surface Antigen    Hepatitis C Antibody    TSH    T4, Free       GI    Gastroesophageal reflux disease    Relevant Medications     hydrOXYchloroQUINE (PLAQUENIL) 200 mg tablet    LYRICA 150 mg capsule    meloxicam (MOBIC) 15 MG tablet    omeprazole (PRILOSEC) 40 MG capsule    tiZANidine (ZANAFLEX) 4 MG tablet    Other Relevant Orders    Quantiferon Gold TB    CBC Auto Differential    Comprehensive Metabolic Panel    CRP, High Sensitivity    Vitamin D    Rheumatoid Quantitative    Cyclic Citrullinated Peptide Antibody, IgG    Antinuclear Ab, HEp-2 Substrate    Hepatitis B Surface Antigen    Hepatitis C Antibody    TSH    T4, Free       Orthopedic    Fibromyalgia syndrome    Relevant Medications    hydrOXYchloroQUINE (PLAQUENIL) 200 mg tablet    LYRICA 150 mg capsule    meloxicam (MOBIC) 15 MG tablet    omeprazole (PRILOSEC) 40 MG capsule    tiZANidine (ZANAFLEX) 4 MG tablet    Other Relevant Orders    Quantiferon Gold TB    CBC Auto Differential    Comprehensive Metabolic Panel    CRP, High Sensitivity    Vitamin D    Rheumatoid Quantitative    Cyclic Citrullinated Peptide Antibody, IgG    Antinuclear Ab, HEp-2 Substrate    Hepatitis B Surface Antigen    Hepatitis C Antibody    TSH    T4, Free    Rotator cuff syndrome of right shoulder    Relevant Medications    hydrOXYchloroQUINE (PLAQUENIL) 200 mg tablet    LYRICA 150 mg capsule    meloxicam (MOBIC) 15 MG tablet    omeprazole (PRILOSEC) 40 MG capsule    tiZANidine (ZANAFLEX) 4 MG tablet    Other Relevant Orders    Quantiferon Gold TB    CBC Auto Differential    Comprehensive Metabolic Panel    CRP, High Sensitivity    Vitamin D    Rheumatoid Quantitative    Cyclic Citrullinated Peptide Antibody, IgG    Antinuclear Ab, HEp-2 Substrate    Hepatitis B Surface Antigen    Hepatitis C Antibody    TSH    T4, Free    Disorder of rotator cuff, left    Relevant Medications    hydrOXYchloroQUINE (PLAQUENIL) 200 mg tablet    LYRICA 150 mg capsule    meloxicam (MOBIC) 15 MG tablet    omeprazole (PRILOSEC) 40 MG capsule    tiZANidine (ZANAFLEX) 4 MG tablet    Other Relevant Orders    Quantiferon Gold TB     CBC Auto Differential    Comprehensive Metabolic Panel    CRP, High Sensitivity    Vitamin D    Rheumatoid Quantitative    Cyclic Citrullinated Peptide Antibody, IgG    Antinuclear Ab, HEp-2 Substrate    Hepatitis B Surface Antigen    Hepatitis C Antibody    TSH    T4, Free       Other    Primary insomnia    Relevant Medications    hydrOXYchloroQUINE (PLAQUENIL) 200 mg tablet    LYRICA 150 mg capsule    meloxicam (MOBIC) 15 MG tablet    omeprazole (PRILOSEC) 40 MG capsule    tiZANidine (ZANAFLEX) 4 MG tablet    Other Relevant Orders    Quantiferon Gold TB    CBC Auto Differential    Comprehensive Metabolic Panel    CRP, High Sensitivity    Vitamin D    Rheumatoid Quantitative    Cyclic Citrullinated Peptide Antibody, IgG    Antinuclear Ab, HEp-2 Substrate    Hepatitis B Surface Antigen    Hepatitis C Antibody    TSH    T4, Free     Other Visit Diagnoses       Seronegative rheumatoid arthritis    -  Primary    Relevant Medications    adalimumab (HUMIRA,CF, PEN) 40 mg/0.4 mL PnKt

## 2023-08-21 ENCOUNTER — TELEPHONE (OUTPATIENT)
Dept: RHEUMATOLOGY | Facility: CLINIC | Age: 58
End: 2023-08-21
Payer: COMMERCIAL

## 2023-08-21 DIAGNOSIS — M32.10 SYSTEMIC LUPUS ERYTHEMATOSUS, ORGAN OR SYSTEM INVOLVEMENT UNSPECIFIED: ICD-10-CM

## 2023-08-21 DIAGNOSIS — F51.01 PRIMARY INSOMNIA: ICD-10-CM

## 2023-08-21 DIAGNOSIS — K21.9 GASTROESOPHAGEAL REFLUX DISEASE, UNSPECIFIED WHETHER ESOPHAGITIS PRESENT: ICD-10-CM

## 2023-08-21 DIAGNOSIS — J44.9 CHRONIC OBSTRUCTIVE PULMONARY DISEASE, UNSPECIFIED COPD TYPE: ICD-10-CM

## 2023-08-21 DIAGNOSIS — F31.9 BIPOLAR AFFECTIVE DISORDER, REMISSION STATUS UNSPECIFIED: ICD-10-CM

## 2023-08-21 DIAGNOSIS — M67.912 DISORDER OF ROTATOR CUFF, LEFT: ICD-10-CM

## 2023-08-21 DIAGNOSIS — M75.101 ROTATOR CUFF SYNDROME OF RIGHT SHOULDER: ICD-10-CM

## 2023-08-21 DIAGNOSIS — M79.7 FIBROMYALGIA SYNDROME: ICD-10-CM

## 2023-08-21 DIAGNOSIS — M51.36 DISC DEGENERATION, LUMBAR: ICD-10-CM

## 2023-08-21 NOTE — TELEPHONE ENCOUNTER
"----- Message from Nila Shirley sent at 8/21/2023 11:32 AM CDT -----  Regarding: RA or SLE  Good Morning. I wanted to double check with you before sending this prior authorization for this patient's Humira. Her notes have a diagnosis of SLE (which I do not see that Humira is indicated for) But her script states "RHEUMATOID ARTHRITIS RESISTANT TO METHOTREXATE PLAQUENIL STEROIDS AND NSAIDS"  I just wanted to make sure that it is ok with you to use the RA diagnosis to get the Humira approved?    "

## 2023-08-21 NOTE — TELEPHONE ENCOUNTER
Em from the pharmacy calling stating that the pt;s insurance will not cover brand name Lyrica. They are asking for the generic to be sent in.

## 2023-08-22 ENCOUNTER — LAB VISIT (OUTPATIENT)
Dept: LAB | Facility: HOSPITAL | Age: 58
End: 2023-08-22
Attending: INTERNAL MEDICINE
Payer: COMMERCIAL

## 2023-08-22 DIAGNOSIS — F31.9 BIPOLAR AFFECTIVE DISORDER, REMISSION STATUS UNSPECIFIED: ICD-10-CM

## 2023-08-22 DIAGNOSIS — M79.7 FIBROMYALGIA SYNDROME: ICD-10-CM

## 2023-08-22 DIAGNOSIS — M51.36 DISC DEGENERATION, LUMBAR: ICD-10-CM

## 2023-08-22 DIAGNOSIS — M75.101 ROTATOR CUFF SYNDROME OF RIGHT SHOULDER: ICD-10-CM

## 2023-08-22 DIAGNOSIS — M32.10 SYSTEMIC LUPUS ERYTHEMATOSUS, ORGAN OR SYSTEM INVOLVEMENT UNSPECIFIED: ICD-10-CM

## 2023-08-22 DIAGNOSIS — K21.9 GASTROESOPHAGEAL REFLUX DISEASE, UNSPECIFIED WHETHER ESOPHAGITIS PRESENT: ICD-10-CM

## 2023-08-22 DIAGNOSIS — J44.9 CHRONIC OBSTRUCTIVE PULMONARY DISEASE, UNSPECIFIED COPD TYPE: ICD-10-CM

## 2023-08-22 DIAGNOSIS — M67.912 DISORDER OF ROTATOR CUFF, LEFT: ICD-10-CM

## 2023-08-22 DIAGNOSIS — F51.01 PRIMARY INSOMNIA: ICD-10-CM

## 2023-08-22 LAB
ALBUMIN SERPL-MCNC: 3.7 G/DL (ref 3.4–4.8)
ALBUMIN/GLOB SERPL: 1.1 RATIO (ref 1.1–2)
ALP SERPL-CCNC: 155 UNIT/L (ref 40–150)
ALT SERPL-CCNC: 20 UNIT/L (ref 0–55)
AST SERPL-CCNC: 28 UNIT/L (ref 5–34)
BASOPHILS # BLD AUTO: 0.04 X10(3)/MCL
BASOPHILS NFR BLD AUTO: 0.6 %
BILIRUB SERPL-MCNC: 0.3 MG/DL
BUN SERPL-MCNC: 11.5 MG/DL (ref 9.8–20.1)
CALCIUM SERPL-MCNC: 8.8 MG/DL (ref 8.4–10.2)
CHLORIDE SERPL-SCNC: 99 MMOL/L (ref 98–107)
CO2 SERPL-SCNC: 26 MMOL/L (ref 23–31)
CREAT SERPL-MCNC: 1.05 MG/DL (ref 0.55–1.02)
CRP SERPL HS-MCNC: 30.82 MG/L
DEPRECATED CALCIDIOL+CALCIFEROL SERPL-MC: 42.3 NG/ML (ref 30–80)
EOSINOPHIL # BLD AUTO: 0.08 X10(3)/MCL (ref 0–0.9)
EOSINOPHIL NFR BLD AUTO: 1.3 %
ERYTHROCYTE [DISTWIDTH] IN BLOOD BY AUTOMATED COUNT: 15.7 % (ref 11.5–17)
GFR SERPLBLD CREATININE-BSD FMLA CKD-EPI: >60 MLS/MIN/1.73/M2
GLOBULIN SER-MCNC: 3.3 GM/DL (ref 2.4–3.5)
GLUCOSE SERPL-MCNC: 163 MG/DL (ref 82–115)
HCT VFR BLD AUTO: 36.8 % (ref 37–47)
HGB BLD-MCNC: 11.9 G/DL (ref 12–16)
IMM GRANULOCYTES # BLD AUTO: 0.14 X10(3)/MCL (ref 0–0.04)
IMM GRANULOCYTES NFR BLD AUTO: 2.2 %
LYMPHOCYTES # BLD AUTO: 2.11 X10(3)/MCL (ref 0.6–4.6)
LYMPHOCYTES NFR BLD AUTO: 33.5 %
MCH RBC QN AUTO: 28.7 PG (ref 27–31)
MCHC RBC AUTO-ENTMCNC: 32.3 G/DL (ref 33–36)
MCV RBC AUTO: 88.9 FL (ref 80–94)
MONOCYTES # BLD AUTO: 0.33 X10(3)/MCL (ref 0.1–1.3)
MONOCYTES NFR BLD AUTO: 5.2 %
NEUTROPHILS # BLD AUTO: 3.6 X10(3)/MCL (ref 2.1–9.2)
NEUTROPHILS NFR BLD AUTO: 57.2 %
NRBC BLD AUTO-RTO: 0 %
PLATELET # BLD AUTO: 219 X10(3)/MCL (ref 130–400)
PMV BLD AUTO: 11.7 FL (ref 7.4–10.4)
POTASSIUM SERPL-SCNC: 3.9 MMOL/L (ref 3.5–5.1)
PROT SERPL-MCNC: 7 GM/DL (ref 5.8–7.6)
RBC # BLD AUTO: 4.14 X10(6)/MCL (ref 4.2–5.4)
RHEUMATOID FACT SERPL-ACNC: <13 IU/ML
SODIUM SERPL-SCNC: 134 MMOL/L (ref 136–145)
T4 FREE SERPL-MCNC: 0.83 NG/DL (ref 0.7–1.48)
TSH SERPL-ACNC: 3.61 UIU/ML (ref 0.35–4.94)
WBC # SPEC AUTO: 6.3 X10(3)/MCL (ref 4.5–11.5)

## 2023-08-22 PROCEDURE — 86803 HEPATITIS C AB TEST: CPT

## 2023-08-22 PROCEDURE — 80053 COMPREHEN METABOLIC PANEL: CPT

## 2023-08-22 PROCEDURE — 86480 TB TEST CELL IMMUN MEASURE: CPT

## 2023-08-22 PROCEDURE — 85025 COMPLETE CBC W/AUTO DIFF WBC: CPT

## 2023-08-22 PROCEDURE — 86431 RHEUMATOID FACTOR QUANT: CPT

## 2023-08-22 PROCEDURE — 36415 COLL VENOUS BLD VENIPUNCTURE: CPT

## 2023-08-22 PROCEDURE — 86141 C-REACTIVE PROTEIN HS: CPT

## 2023-08-22 PROCEDURE — 84443 ASSAY THYROID STIM HORMONE: CPT

## 2023-08-22 PROCEDURE — 84439 ASSAY OF FREE THYROXINE: CPT

## 2023-08-22 PROCEDURE — 87340 HEPATITIS B SURFACE AG IA: CPT

## 2023-08-22 PROCEDURE — 86039 ANTINUCLEAR ANTIBODIES (ANA): CPT

## 2023-08-22 PROCEDURE — 82306 VITAMIN D 25 HYDROXY: CPT

## 2023-08-22 PROCEDURE — 86200 CCP ANTIBODY: CPT

## 2023-08-22 RX ORDER — PREGABALIN 150 MG/1
150 CAPSULE ORAL 3 TIMES DAILY
Qty: 90 CAPSULE | Refills: 5 | Status: SHIPPED | OUTPATIENT
Start: 2023-08-22 | End: 2024-02-20

## 2023-08-22 NOTE — TELEPHONE ENCOUNTER
Spoke with Rachele and she said that the prescription on her end said cannot substitute so they cannot give the generic. She will need a new prescription to give the patient the generic.  Thank you Jenny

## 2023-08-23 LAB
ANA PAT SER IF-IMP: ABNORMAL
ANA SER QL HEP2 SUBST: ABNORMAL
ANA TITR SER HEP2 SUBST: ABNORMAL {TITER}
CYCLIC CITRULLINATED PEPTIDE (CCP) (OHS): 1.3 U/ML
HBV SURFACE AG SERPL QL IA: NONREACTIVE
HCV AB SERPL QL IA: NONREACTIVE

## 2023-08-24 LAB
GAMMA INTERFERON BACKGROUND BLD IA-ACNC: 0.04 IU/ML
M TB IFN-G BLD-IMP: NEGATIVE
M TB IFN-G CD4+ BCKGRND COR BLD-ACNC: 0.01 IU/ML
M TB IFN-G CD4+CD8+ BCKGRND COR BLD-ACNC: 0 IU/ML
MITOGEN IGNF BCKGRD COR BLD-ACNC: 9.96 IU/ML

## 2023-10-24 ENCOUNTER — HOSPITAL ENCOUNTER (EMERGENCY)
Facility: HOSPITAL | Age: 58
Discharge: HOME | End: 2023-10-24
Attending: EMERGENCY MEDICINE
Payer: COMMERCIAL

## 2023-10-24 VITALS
HEART RATE: 64 BPM | BODY MASS INDEX: 40.03 KG/M2 | WEIGHT: 226 LBS | OXYGEN SATURATION: 98 % | TEMPERATURE: 98 F | RESPIRATION RATE: 18 BRPM | SYSTOLIC BLOOD PRESSURE: 137 MMHG | DIASTOLIC BLOOD PRESSURE: 87 MMHG

## 2023-10-24 DIAGNOSIS — R42 DIZZINESS: ICD-10-CM

## 2023-10-24 DIAGNOSIS — R56.9 SEIZURE-LIKE ACTIVITY: Primary | ICD-10-CM

## 2023-10-24 LAB
ALBUMIN SERPL-MCNC: 3.9 G/DL (ref 3.4–4.8)
ALBUMIN/GLOB SERPL: 1.1 RATIO (ref 1.1–2)
ALP SERPL-CCNC: 151 UNIT/L (ref 40–150)
ALT SERPL-CCNC: 26 UNIT/L (ref 0–55)
APPEARANCE UR: CLEAR
AST SERPL-CCNC: 34 UNIT/L (ref 5–34)
BACTERIA #/AREA URNS AUTO: NORMAL /HPF
BASOPHILS # BLD AUTO: 0.08 X10(3)/MCL
BASOPHILS NFR BLD AUTO: 0.7 %
BILIRUB SERPL-MCNC: 0.4 MG/DL
BILIRUB UR QL STRIP.AUTO: NEGATIVE
BUN SERPL-MCNC: 14.2 MG/DL (ref 9.8–20.1)
CALCIUM SERPL-MCNC: 8.8 MG/DL (ref 8.4–10.2)
CHLORIDE SERPL-SCNC: 96 MMOL/L (ref 98–107)
CO2 SERPL-SCNC: 26 MMOL/L (ref 23–31)
COLOR UR AUTO: YELLOW
CREAT SERPL-MCNC: 1.07 MG/DL (ref 0.55–1.02)
EOSINOPHIL # BLD AUTO: 0.09 X10(3)/MCL (ref 0–0.9)
EOSINOPHIL NFR BLD AUTO: 0.8 %
ERYTHROCYTE [DISTWIDTH] IN BLOOD BY AUTOMATED COUNT: 15.1 % (ref 11.5–17)
GFR SERPLBLD CREATININE-BSD FMLA CKD-EPI: 60 MLS/MIN/1.73/M2
GLOBULIN SER-MCNC: 3.5 GM/DL (ref 2.4–3.5)
GLUCOSE SERPL-MCNC: 82 MG/DL (ref 82–115)
GLUCOSE UR QL STRIP.AUTO: NEGATIVE
HCT VFR BLD AUTO: 40.3 % (ref 37–47)
HGB BLD-MCNC: 13.2 G/DL (ref 12–16)
IMM GRANULOCYTES # BLD AUTO: 0.09 X10(3)/MCL (ref 0–0.04)
IMM GRANULOCYTES NFR BLD AUTO: 0.8 %
KETONES UR QL STRIP.AUTO: NEGATIVE
LEUKOCYTE ESTERASE UR QL STRIP.AUTO: NEGATIVE
LYMPHOCYTES # BLD AUTO: 2.6 X10(3)/MCL (ref 0.6–4.6)
LYMPHOCYTES NFR BLD AUTO: 24.3 %
MCH RBC QN AUTO: 29.1 PG (ref 27–31)
MCHC RBC AUTO-ENTMCNC: 32.8 G/DL (ref 33–36)
MCV RBC AUTO: 88.8 FL (ref 80–94)
MONOCYTES # BLD AUTO: 0.68 X10(3)/MCL (ref 0.1–1.3)
MONOCYTES NFR BLD AUTO: 6.3 %
NEUTROPHILS # BLD AUTO: 7.18 X10(3)/MCL (ref 2.1–9.2)
NEUTROPHILS NFR BLD AUTO: 67.1 %
NITRITE UR QL STRIP.AUTO: NEGATIVE
NRBC BLD AUTO-RTO: 0 %
PH UR STRIP.AUTO: 7 [PH]
PLATELET # BLD AUTO: 253 X10(3)/MCL (ref 130–400)
PLATELETS.RETICULATED NFR BLD AUTO: 5.9 % (ref 0.9–11.2)
PMV BLD AUTO: 10.4 FL (ref 7.4–10.4)
POCT GLUCOSE: 77 MG/DL (ref 70–110)
POTASSIUM SERPL-SCNC: 4 MMOL/L (ref 3.5–5.1)
PROT SERPL-MCNC: 7.4 GM/DL (ref 5.8–7.6)
PROT UR QL STRIP.AUTO: NEGATIVE
RBC # BLD AUTO: 4.54 X10(6)/MCL (ref 4.2–5.4)
RBC #/AREA URNS AUTO: <5 /HPF
RBC UR QL AUTO: NEGATIVE
SODIUM SERPL-SCNC: 132 MMOL/L (ref 136–145)
SP GR UR STRIP.AUTO: 1.01 (ref 1–1.03)
SQUAMOUS #/AREA URNS AUTO: <5 /HPF
TROPONIN I SERPL-MCNC: <0.01 NG/ML (ref 0–0.04)
UROBILINOGEN UR STRIP-ACNC: 1
WBC # SPEC AUTO: 10.72 X10(3)/MCL (ref 4.5–11.5)
WBC #/AREA URNS AUTO: <5 /HPF

## 2023-10-24 PROCEDURE — 25000003 PHARM REV CODE 250: Performed by: EMERGENCY MEDICINE

## 2023-10-24 PROCEDURE — 63600175 PHARM REV CODE 636 W HCPCS: Performed by: EMERGENCY MEDICINE

## 2023-10-24 PROCEDURE — 84484 ASSAY OF TROPONIN QUANT: CPT | Performed by: PHYSICIAN ASSISTANT

## 2023-10-24 PROCEDURE — 93010 EKG 12-LEAD: ICD-10-PCS | Mod: ,,, | Performed by: INTERNAL MEDICINE

## 2023-10-24 PROCEDURE — 81001 URINALYSIS AUTO W/SCOPE: CPT | Performed by: PHYSICIAN ASSISTANT

## 2023-10-24 PROCEDURE — 96365 THER/PROPH/DIAG IV INF INIT: CPT

## 2023-10-24 PROCEDURE — 99285 EMERGENCY DEPT VISIT HI MDM: CPT | Mod: 25

## 2023-10-24 PROCEDURE — 93010 ELECTROCARDIOGRAM REPORT: CPT | Mod: ,,, | Performed by: INTERNAL MEDICINE

## 2023-10-24 PROCEDURE — 85025 COMPLETE CBC W/AUTO DIFF WBC: CPT | Performed by: PHYSICIAN ASSISTANT

## 2023-10-24 PROCEDURE — 82962 GLUCOSE BLOOD TEST: CPT

## 2023-10-24 PROCEDURE — 80053 COMPREHEN METABOLIC PANEL: CPT | Performed by: PHYSICIAN ASSISTANT

## 2023-10-24 PROCEDURE — 93005 ELECTROCARDIOGRAM TRACING: CPT

## 2023-10-24 RX ORDER — LEVETIRACETAM 500 MG/1
500 TABLET ORAL 2 TIMES DAILY
Qty: 60 TABLET | Refills: 11 | Status: SHIPPED | OUTPATIENT
Start: 2023-10-24 | End: 2024-10-23

## 2023-10-24 RX ADMIN — LEVETIRACETAM 500 MG: 100 INJECTION, SOLUTION INTRAVENOUS at 05:10

## 2023-10-24 NOTE — ED PROVIDER NOTES
Encounter Date: 10/24/2023    SCRIBE #1 NOTE: IKaylene, tom scribing for, and in the presence of,  Rick Coleman III, MD. I have scribed the following portions of the note - Other sections scribed: HPI, ROS, PE.       History     Chief Complaint   Patient presents with    Dizziness     Dizziness that started at 8 am, Pt states she was walking and while standing she froze standing up.Pt was states she started shaking was able to walk with assistance. Steady gait in triage. Aox4. Denies numbness/tingling. Denies cp, sob, abd pain. No drift, no facial droop. Cbg 77     58 year old female with history of seizures, asthma, bipolar disorder, DM, HLD, HTN, thyroid disease, and narcolepsy presents to the ED with complaints of episodes of not being able to move. Pt reports that she has these episodes that last 3-4 minutes about once a week where she suddenly cannot move. During these episodes, sometimes her head will hurt or she will have urinary incontinence. When she comes to, it takes a while for her to feel normal. She notes that she is not on seizure medication. She denies symptoms of visual changes, cough, congestion, and rhinorrhea.     The history is provided by the patient. No  was used.     Review of patient's allergies indicates:   Allergen Reactions    Penicillins Other (See Comments) and Itching     unknown  Other reaction(s): digestive, nausea    Adhesive tape-silicones Rash     Other reaction(s): allergic to paper tape only, paper tape allergy    Codeine Itching     Other reaction(s): penicillin G benzathine    Lamotrigine Rash     Past Medical History:   Diagnosis Date    Allergy     Arthritis     Asthma     Bipolar 1 disorder     Carpal tunnel syndrome     right    Diabetes mellitus     Dry eyes     GERD (gastroesophageal reflux disease)     Glaucoma     Hyperlipidemia     Hypertension     Narcolepsy     OCD (obsessive compulsive disorder)     Osteoporosis     Plantar fascia  syndrome     Thyroid disease     Urinary incontinence      Past Surgical History:   Procedure Laterality Date    ADENOIDECTOMY  1985    APPENDECTOMY      CARPAL TUNNEL RELEASE Right     CATARACT EXTRACTION      CHOLECYSTECTOMY      DILATION AND CURETTAGE OF UTERUS      EYE SURGERY  2005    PLANTAR FASCIA SURGERY      ROTATOR CUFF REPAIR Right     TONSILLECTOMY      TUBAL LIGATION       Family History   Problem Relation Age of Onset    Stroke Mother     Hypertension Mother     Heart disease Mother     Heart attack Mother     Arthritis Mother     Depression Mother     Diabetes Mother     Mental illness Mother     Miscarriages / Stillbirths Mother     Cancer Father     Hypertension Father     Diabetes Mellitus Father     Alcohol abuse Father     Diabetes Father     Vision loss Father     Cancer Paternal Grandfather     Cancer Paternal Grandmother      Social History     Tobacco Use    Smoking status: Every Day     Current packs/day: 1.00     Average packs/day: 1 pack/day for 42.5 years (42.5 ttl pk-yrs)     Types: Cigarettes     Start date: 4/18/1981    Smokeless tobacco: Never   Substance Use Topics    Alcohol use: No    Drug use: No     Review of Systems   HENT:  Negative for congestion and rhinorrhea.    Eyes:  Negative for visual disturbance.   Respiratory:  Negative for cough.    Neurological:  Positive for headaches.        Incontinence. Not being able to move.        Physical Exam     Initial Vitals [10/24/23 1153]   BP Pulse Resp Temp SpO2   (!) 148/90 88 19 98.3 °F (36.8 °C) 100 %      MAP       --         Physical Exam    Nursing note and vitals reviewed.  Constitutional: No distress.   Smells strongly of tobacco    HENT:   Head: Normocephalic and atraumatic.   Neck: Trachea normal.   Cardiovascular:  Normal rate and regular rhythm.           No murmur heard.  Pulmonary/Chest: Breath sounds normal. No respiratory distress.   Abdominal: Abdomen is soft. Bowel sounds are normal. She exhibits no distension.  There is no abdominal tenderness.   Musculoskeletal:         General: Normal range of motion.      Lumbar back: Normal range of motion.     Neurological: She is alert and oriented to person, place, and time. She has normal strength. No cranial nerve deficit.   Skin: Skin is warm and dry. No rash noted.   Psychiatric: She has a normal mood and affect. Judgment normal.         ED Course   Procedures  Labs Reviewed   COMPREHENSIVE METABOLIC PANEL - Abnormal; Notable for the following components:       Result Value    Sodium Level 132 (*)     Chloride 96 (*)     Creatinine 1.07 (*)     Alkaline Phosphatase 151 (*)     All other components within normal limits   CBC WITH DIFFERENTIAL - Abnormal; Notable for the following components:    MCHC 32.8 (*)     IG# 0.09 (*)     All other components within normal limits   TROPONIN I - Normal   URINALYSIS, REFLEX TO URINE CULTURE - Normal   URINALYSIS, MICROSCOPIC - Normal   CBC W/ AUTO DIFFERENTIAL    Narrative:     The following orders were created for panel order CBC auto differential.  Procedure                               Abnormality         Status                     ---------                               -----------         ------                     CBC with Differential[[id number]]       Abnormal            Final result                 Please view results for these tests on the individual orders.   POCT GLUCOSE          Imaging Results              CT Head Without Contrast (Final result)  Result time 10/24/23 13:22:12      Final result by Leena Espinoza MD (10/24/23 13:22:12)                   Impression:      No acute abnormality seen      Electronically signed by: Leena Espinoza  Date:    10/24/2023  Time:    13:22               Narrative:    EXAMINATION:  CT HEAD WITHOUT CONTRAST    CLINICAL HISTORY:  Transient ischemic attack (TIA);    TECHNIQUE:  Multiple axial images were obtained from the base of the brain to the vertex without contrast  administration.  Sagittal and coronal reconstructions were performed. .Automatic exposure control  (AEC) is utilized to reduce patient radiation exposure.    COMPARISON:  07/03/2015    FINDINGS:  There is no intracranial mass or lesion seen.  No hemorrhage is seen.  No infarct is seen.  The ventricles and basilar cisterns appear normal.  Brain parenchyma appears grossly unremarkable.    Posterior fossa appears normal.  The calvarium is intact.  The paranasal sinuses appear grossly unremarkable.                                       Medications   levETIRAcetam (Keppra) 500 mg in dextrose 5 % in water (D5W) 100 mL IVPB (MB+) (500 mg Intravenous New Bag 10/24/23 1723)     Medical Decision Making  Differential diagnosis includes near syncopal event psychogenic event seizure    Patient states that she froze and had lip quivering that is worrisome for an absence seizure.  States she is had a number these in the past CT head without abnormality CBC chemistry normal was told that she may have had seizures in the past given these events or regular and worsening will place on Keppra until she follows up with her primary care doctor return emergency room as needed        Problems Addressed:  Seizure-like activity: complicated acute illness or injury    Risk  Prescription drug management.              Attending Attestation:           Physician Attestation for Scribe:  Physician Attestation Statement for Scribe #1: I, Rick Coleman III, MD, reviewed documentation, as scribed by Kaylene Kaye in my presence, and it is both accurate and complete.                             Clinical Impression:   Final diagnoses:  [R42] Dizziness  [R56.9] Seizure-like activity (Primary)        ED Disposition Condition    Discharge Stable          ED Prescriptions       Medication Sig Dispense Start Date End Date Auth. Provider    levETIRAcetam (KEPPRA) 500 MG Tab Take 1 tablet (500 mg total) by mouth 2 (two) times daily. 60 tablet 10/24/2023  10/23/2024 Rick Coleman III, MD          Follow-up Information       Follow up With Specialties Details Why Contact Info    PCP  In 3 days      [facility]    Please call [phone] to get established with a primary care             Rick Coleman III, MD  10/24/23 0553

## 2023-10-24 NOTE — FIRST PROVIDER EVALUATION
Medical screening examination initiated.  I have conducted a focused provider triage encounter, findings are as follows:    Chief Complaint   Patient presents with    Dizziness     Dizziness that started at 8 am, Pt states she was walking and while standing she froze standing up.Pt was states she started shaking was able to walk with assistance. Steady gait in triage. Aox4. Denies numbness/tingling. Denies cp, sob, abd pain. No drift, no facial droop     Brief history of present illness:  58 y.o. female presents to the ED with dizziness onset this morning. Denies chest pain, SOB, abdominal pain, n/v, headache. CBG 77 in triage    Vitals:    10/24/23 1153   BP: (!) 148/90   Pulse: 88   Resp: 19   Temp: 98.3 °F (36.8 °C)   SpO2: 100%   Weight: 102.5 kg (226 lb)       Pertinent physical exam:  Awake, alert, ambulatory, non-labored respirations, no drift/droop/slurred speech     Brief workup plan:  labs, UA, EKG, CT    Preliminary workup initiated; this workup will be continued and followed by the physician or advanced practice provider that is assigned to the patient when roomed.

## 2023-12-04 ENCOUNTER — TELEPHONE (OUTPATIENT)
Dept: RHEUMATOLOGY | Facility: CLINIC | Age: 58
End: 2023-12-04
Payer: COMMERCIAL

## 2023-12-04 DIAGNOSIS — M79.7 FIBROMYALGIA SYNDROME: ICD-10-CM

## 2023-12-04 DIAGNOSIS — M75.101 ROTATOR CUFF SYNDROME OF RIGHT SHOULDER: ICD-10-CM

## 2023-12-04 DIAGNOSIS — M51.36 DISC DEGENERATION, LUMBAR: ICD-10-CM

## 2023-12-04 DIAGNOSIS — M32.19 OTHER SYSTEMIC LUPUS ERYTHEMATOSUS WITH OTHER ORGAN INVOLVEMENT: ICD-10-CM

## 2023-12-04 DIAGNOSIS — F51.01 PRIMARY INSOMNIA: ICD-10-CM

## 2023-12-05 DIAGNOSIS — G40.A09: Primary | ICD-10-CM

## 2023-12-05 RX ORDER — DENOSUMAB 60 MG/ML
INJECTION SUBCUTANEOUS
Qty: 1 EACH | Refills: 5 | OUTPATIENT
Start: 2023-12-05

## 2023-12-05 NOTE — TELEPHONE ENCOUNTER
I do not order EEG. They cannot schedule without an order. .l Anyway, I see that Kimmy Lopez already placed the order today. Please make sure she has a future plan to continue treating her Lupus too.  Thanks

## 2023-12-05 NOTE — TELEPHONE ENCOUNTER
Patient will call [facility] and see who they pay for in [place] she is not sure she wants to go to [facility] . She will let us know in a day or so.   Thank you Jenny

## 2023-12-05 NOTE — TELEPHONE ENCOUNTER
Pt have an EEG schedule on 12/08/2023, they called stating there is no order for the EEG, if one could please be faxed over to them.

## 2023-12-06 NOTE — TELEPHONE ENCOUNTER
Spoke with patient; she is currently undecided of future Rheumatology care (unsure if she wants a referral or follow Dr. Vaz) She would not like us to call back and push her into a decision. Patient made aware that we just wanted to make sure she continued care and treatment for her Lupus diagnosis. Patient voiced understanding.

## 2024-02-13 PROBLEM — M54.2 CERVICALGIA: Status: ACTIVE | Noted: 2024-02-13

## 2024-06-14 ENCOUNTER — HOSPITAL ENCOUNTER (EMERGENCY)
Facility: HOSPITAL | Age: 59
Discharge: HOME | End: 2024-06-14
Attending: STUDENT IN AN ORGANIZED HEALTH CARE EDUCATION/TRAINING PROGRAM
Payer: COMMERCIAL

## 2024-06-14 VITALS
HEIGHT: 63 IN | BODY MASS INDEX: 40.04 KG/M2 | SYSTOLIC BLOOD PRESSURE: 142 MMHG | OXYGEN SATURATION: 100 % | HEART RATE: 60 BPM | TEMPERATURE: 98 F | WEIGHT: 226 LBS | DIASTOLIC BLOOD PRESSURE: 73 MMHG | RESPIRATION RATE: 17 BRPM

## 2024-06-14 DIAGNOSIS — I10 HYPERTENSION, UNSPECIFIED TYPE: ICD-10-CM
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
## 2024-06-14 DIAGNOSIS — R53.1 WEAKNESS: ICD-10-CM

## 2024-06-14 DIAGNOSIS — S30.0XXA CONTUSION OF LOWER BACK, INITIAL ENCOUNTER: ICD-10-CM

## 2024-06-14 LAB
ALBUMIN SERPL-MCNC: 3.8 G/DL (ref 3.4–4.8)
ALBUMIN/GLOB SERPL: 0.8 RATIO (ref 1.1–2)
ALP SERPL-CCNC: 146 UNIT/L (ref 40–150)
ALT SERPL-CCNC: 26 UNIT/L (ref 0–55)
ANION GAP SERPL CALC-SCNC: 8 MEQ/L
AST SERPL-CCNC: 43 UNIT/L (ref 5–34)
BACTERIA #/AREA URNS AUTO: ABNORMAL /HPF
BASOPHILS # BLD AUTO: 0.07 X10(3)/MCL
BASOPHILS NFR BLD AUTO: 0.8 %
BILIRUB SERPL-MCNC: 0.3 MG/DL
BILIRUB UR QL STRIP.AUTO: NEGATIVE
BUN SERPL-MCNC: 13.6 MG/DL (ref 9.8–20.1)
CALCIUM SERPL-MCNC: 9.2 MG/DL (ref 8.4–10.2)
CHLORIDE SERPL-SCNC: 93 MMOL/L (ref 98–107)
CLARITY UR: CLEAR
CO2 SERPL-SCNC: 27 MMOL/L (ref 23–31)
COLOR UR AUTO: ABNORMAL
CREAT SERPL-MCNC: 1.1 MG/DL (ref 0.55–1.02)
CREAT/UREA NIT SERPL: 12
EOSINOPHIL # BLD AUTO: 0.13 X10(3)/MCL (ref 0–0.9)
EOSINOPHIL NFR BLD AUTO: 1.5 %
ERYTHROCYTE [DISTWIDTH] IN BLOOD BY AUTOMATED COUNT: 14.9 % (ref 11.5–17)
GFR SERPLBLD CREATININE-BSD FMLA CKD-EPI: 57 ML/MIN/1.73/M2
GLOBULIN SER-MCNC: 4.5 GM/DL (ref 2.4–3.5)
GLUCOSE SERPL-MCNC: 97 MG/DL (ref 82–115)
GLUCOSE UR QL STRIP: NORMAL
HCT VFR BLD AUTO: 41.4 % (ref 37–47)
HGB BLD-MCNC: 13.4 G/DL (ref 12–16)
HGB UR QL STRIP: ABNORMAL
IMM GRANULOCYTES # BLD AUTO: 0.08 X10(3)/MCL (ref 0–0.04)
IMM GRANULOCYTES NFR BLD AUTO: 0.9 %
KETONES UR QL STRIP: NEGATIVE
LEUKOCYTE ESTERASE UR QL STRIP: 250
LYMPHOCYTES # BLD AUTO: 2.08 X10(3)/MCL (ref 0.6–4.6)
LYMPHOCYTES NFR BLD AUTO: 24.2 %
MCH RBC QN AUTO: 28.7 PG (ref 27–31)
MCHC RBC AUTO-ENTMCNC: 32.4 G/DL (ref 33–36)
MCV RBC AUTO: 88.7 FL (ref 80–94)
MONOCYTES # BLD AUTO: 0.69 X10(3)/MCL (ref 0.1–1.3)
MONOCYTES NFR BLD AUTO: 8 %
MUCOUS THREADS URNS QL MICRO: ABNORMAL /LPF
NEUTROPHILS # BLD AUTO: 5.56 X10(3)/MCL (ref 2.1–9.2)
NEUTROPHILS NFR BLD AUTO: 64.6 %
NITRITE UR QL STRIP: NEGATIVE
NRBC BLD AUTO-RTO: 0 %
OHS QRS DURATION: 88 MS
OHS QTC CALCULATION: 464 MS
PH UR STRIP: 6.5 [PH]
PLATELET # BLD AUTO: 260 X10(3)/MCL (ref 130–400)
PMV BLD AUTO: 10.5 FL (ref 7.4–10.4)
POTASSIUM SERPL-SCNC: 4.1 MMOL/L (ref 3.5–5.1)
PROT SERPL-MCNC: 8.3 GM/DL (ref 5.8–7.6)
PROT UR QL STRIP: NEGATIVE
RBC # BLD AUTO: 4.67 X10(6)/MCL (ref 4.2–5.4)
RBC #/AREA URNS AUTO: ABNORMAL /HPF
SODIUM SERPL-SCNC: 128 MMOL/L (ref 136–145)
SP GR UR STRIP.AUTO: 1.01 (ref 1–1.03)
SQUAMOUS #/AREA URNS LPF: ABNORMAL /HPF
TROPONIN I SERPL-MCNC: <0.01 NG/ML (ref 0–0.04)
UROBILINOGEN UR STRIP-ACNC: NORMAL
WBC # BLD AUTO: 8.61 X10(3)/MCL (ref 4.5–11.5)
WBC #/AREA URNS AUTO: ABNORMAL /HPF

## 2024-06-14 PROCEDURE — 96374 THER/PROPH/DIAG INJ IV PUSH: CPT

## 2024-06-14 PROCEDURE — 93005 ELECTROCARDIOGRAM TRACING: CPT

## 2024-06-14 PROCEDURE — 99285 EMERGENCY DEPT VISIT HI MDM: CPT | Mod: 25

## 2024-06-14 PROCEDURE — 93010 ELECTROCARDIOGRAM REPORT: CPT | Mod: ,,, | Performed by: STUDENT IN AN ORGANIZED HEALTH CARE EDUCATION/TRAINING PROGRAM

## 2024-06-14 PROCEDURE — 81001 URINALYSIS AUTO W/SCOPE: CPT | Performed by: NURSE PRACTITIONER

## 2024-06-14 PROCEDURE — 80053 COMPREHEN METABOLIC PANEL: CPT | Performed by: NURSE PRACTITIONER

## 2024-06-14 PROCEDURE — 63600175 PHARM REV CODE 636 W HCPCS: Performed by: PHYSICIAN ASSISTANT

## 2024-06-14 PROCEDURE — 85025 COMPLETE CBC W/AUTO DIFF WBC: CPT | Performed by: NURSE PRACTITIONER

## 2024-06-14 PROCEDURE — 84484 ASSAY OF TROPONIN QUANT: CPT | Performed by: NURSE PRACTITIONER

## 2024-06-14 RX ORDER — KETOROLAC TROMETHAMINE 30 MG/ML
30 INJECTION, SOLUTION INTRAMUSCULAR; INTRAVENOUS
Status: DISCONTINUED | OUTPATIENT
Start: 2024-06-14 | End: 2024-06-14

## 2024-06-14 RX ORDER — KETOROLAC TROMETHAMINE 30 MG/ML
15 INJECTION, SOLUTION INTRAMUSCULAR; INTRAVENOUS
Status: COMPLETED | OUTPATIENT
Start: 2024-06-14 | End: 2024-06-14

## 2024-06-14 RX ADMIN — KETOROLAC TROMETHAMINE 15 MG: 30 INJECTION, SOLUTION INTRAMUSCULAR; INTRAVENOUS at 01:06

## 2024-06-14 NOTE — FIRST PROVIDER EVALUATION
"Medical screening examination initiated.  I have conducted a focused provider triage encounter, findings are as follows:    Brief history of present illness:  Patient states that PTA she became weak and dizzy and then fell. Denies hitting her head or any LOC. States lower back pain.     Vitals:    06/14/24 0922   BP: (!) 136/94   Pulse: 75   Resp: 19   Temp: 98.3 °F (36.8 °C)   TempSrc: Oral   SpO2: 96%   Weight: 102.5 kg (226 lb)   Height: 5' 3" (1.6 m)       Pertinent physical exam:  Awake, alert, ambulatory    Brief workup plan:  Labs, EKG, Imaging    Preliminary workup initiated; this workup will be continued and followed by the physician or advanced practice provider that is assigned to the patient when roomed.  "

## 2024-06-14 NOTE — ED PROVIDER NOTES
Encounter Date: 6/14/2024       History     Chief Complaint   Patient presents with    Fall     Presentsv via [facility] following a fall. C/o back pain. Patient reports she became weak and dizzy prior to the fall. States she hit her head. -BT. GCS 15. Ambulatory.      59-year-old  female presents to the emergency room for low back pain after [place] [employer] today.  Patient states she got overheated felt lightheaded in the did not have time to catch anything the way down onto her back.  Patient now having back pain that does not radiate.  Denies any bowel or bladder incontinence.  Numbness or tingling.  Patient has not taken anything prior to arrival.    The history is provided by the patient. No  was used.     Review of patient's allergies indicates:   Allergen Reactions    Penicillins Other (See Comments) and Itching     unknown  Other reaction(s): digestive, nausea    Adhesive tape-silicones Rash     Other reaction(s): allergic to paper tape only, paper tape allergy    Codeine Itching     Other reaction(s): penicillin G benzathine    Lamotrigine Rash     Past Medical History:   Diagnosis Date    Allergy     Arthritis     Asthma     Bipolar 1 disorder     Carpal tunnel syndrome     right    Diabetes mellitus     Dry eyes     GERD (gastroesophageal reflux disease)     Glaucoma     Hyperlipidemia     Hypertension     Narcolepsy     OCD (obsessive compulsive disorder)     Osteoporosis     Plantar fascia syndrome     Thyroid disease     Urinary incontinence      Past Surgical History:   Procedure Laterality Date    ADENOIDECTOMY  1985    APPENDECTOMY      CARPAL TUNNEL RELEASE Right     CATARACT EXTRACTION      CHOLECYSTECTOMY      DILATION AND CURETTAGE OF UTERUS      EYE SURGERY  2005    PLANTAR FASCIA SURGERY      ROTATOR CUFF REPAIR Right     TONSILLECTOMY      TUBAL LIGATION       Family History   Problem Relation Name Age of Onset    Stroke Mother Maria Eugenia Singh     Hypertension Mother  Maria Eugenia Winters     Heart disease Mother Maria Eugenia Winters     Heart attack Mother Maria Eugenia Winters     Arthritis Mother Maria Eugenia Winters     Depression Mother Maria Eugenia Winters     Diabetes Mother Maria Eugenia Winters     Mental illness Mother Maria Eugenia Winters     Miscarriages / Stillbirths Mother Maria Eugenia Winters     Cancer Father Dedrick Winters     Hypertension Father Dedrick Winters     Diabetes Mellitus Father Dedrick Winters     Alcohol abuse Father Dedrick Winters     Diabetes Father Dedrick Winters     Vision loss Father Dedrick Winters     Cancer Paternal Grandfather Kade Winters     Cancer Paternal Grandmother Lola Winters      Social History     Tobacco Use    Smoking status: Every Day     Current packs/day: 1.00     Average packs/day: 1 pack/day for 43.2 years (43.2 ttl pk-yrs)     Types: Cigarettes     Start date: 4/18/1981    Smokeless tobacco: Never   Substance Use Topics    Alcohol use: No    Drug use: No     Review of Systems   Constitutional: Negative.    HENT: Negative.     Eyes: Negative.    Respiratory: Negative.     Cardiovascular: Negative.    Gastrointestinal: Negative.    Genitourinary: Negative.    Musculoskeletal:  Positive for back pain, joint swelling and myalgias. Negative for gait problem.   Neurological:  Positive for light-headedness. Negative for dizziness, seizures, numbness and headaches.       Physical Exam     Initial Vitals [06/14/24 0922]   BP Pulse Resp Temp SpO2   (!) 136/94 75 19 98.3 °F (36.8 °C) 96 %      MAP       --         Physical Exam    Vitals reviewed.  Constitutional: She appears well-developed and well-nourished. She is not diaphoretic. She is Obese . No distress.   Resting comfortably lying on her left side upon entering the room.   HENT:   Head: Normocephalic and atraumatic.   Eyes: Conjunctivae, EOM and lids are normal. Pupils are equal, round, and reactive to light.   Neck: Neck supple.   Normal range of motion.  Cardiovascular:  Normal rate and regular rhythm.           Pulmonary/Chest: Effort  normal and breath sounds normal.   Abdominal: Abdomen is soft and flat. Bowel sounds are normal.   Musculoskeletal:      Cervical back: Normal range of motion and neck supple.      Comments:  5/5 muscle strength in iliopsoas, quadriceps, dorsiflexion,plantar flexion, inversion, eversion, and hamstring function. Intact dermatomes in upper and lower extremities. 1-2+ DTRs bilaterally. Tenderness midline lumbar area.        Neurological: She is alert and oriented to person, place, and time. She has normal strength. She is not disoriented. No cranial nerve deficit or sensory deficit. GCS eye subscore is 4. GCS verbal subscore is 5. GCS motor subscore is 6.   Skin: Skin is warm and intact.   Psychiatric: She has a normal mood and affect. Her speech is normal and behavior is normal. Judgment and thought content normal. Cognition and memory are normal.         ED Course   Procedures  Labs Reviewed   COMPREHENSIVE METABOLIC PANEL - Abnormal; Notable for the following components:       Result Value    Sodium 128 (*)     Chloride 93 (*)     Creatinine 1.10 (*)     Protein Total 8.3 (*)     Globulin 4.5 (*)     Albumin/Globulin Ratio 0.8 (*)     AST 43 (*)     All other components within normal limits   URINALYSIS, REFLEX TO URINE CULTURE - Abnormal; Notable for the following components:    Blood, UA Trace (*)     Leukocyte Esterase,  (*)     Mucous, UA Trace (*)     All other components within normal limits   CBC WITH DIFFERENTIAL - Abnormal; Notable for the following components:    MCHC 32.4 (*)     MPV 10.5 (*)     IG# 0.08 (*)     All other components within normal limits   TROPONIN I - Normal   CBC W/ AUTO DIFFERENTIAL    Narrative:     The following orders were created for panel order CBC Auto Differential.  Procedure                               Abnormality         Status                     ---------                               -----------         ------                     CBC with Differential[[id number]]        Abnormal            Final result                 Please view results for these tests on the individual orders.     EKG Readings: (Independently Interpreted)   Initial Reading: No STEMI. Rhythm: Normal Sinus Rhythm. Ectopy: No Ectopy. Conduction: Normal. ST Segments: Normal ST Segments. Axis: Normal.   NSR@0927. Good rwave.        Imaging Results              X-Ray Lumbar Spine 2 Or 3 Views (Final result)  Result time 06/14/24 10:16:30      Final result by Fredrick De La Cruz MD (06/14/24 10:16:30)                   Impression:      1. No acute fractures or subluxations are identified..    Degenerative changes      Electronically signed by: Fredrick De La Cruz  Date:    06/14/2024  Time:    10:16               Narrative:    EXAMINATION:  XR LUMBAR SPINE 2 OR 3 VIEWS    CPT: 93998    CLINICAL HISTORY:  Fall;    FINDINGS:  Examination reveals 5 non rib-bearing vertebral bodies vertebral bodies are normal height, position and alignment disc spaces are essentially preserved with smooth articular surfaces some degenerative changes seen of the posterior elements at L4-L5 and L5-S1.    No acute fractures or dislocations no other bony abnormality seen                                    X-Rays:   Independently Interpreted Readings:   Other Readings:  No acute fractures.. Some DDD noted L4-L5.    Medications   ketorolac injection 15 mg (15 mg Intravenous Given 6/14/24 1311)     Medical Decision Making  59-year-old  female presented to the emergency room with low back pain after [place] [employer] today.  Patient said she had felt lightheaded because sign it was the way to the ground and did not catch so she fell onto the floor landing on her back.  Patient denies any radiating symptoms or numbness tingling or bowel bladder dysfunction.  Besides hypertension patient vitals and blood work seemed to be in within normal limits.  X-rays were negative for any acute fractures or subluxations.  There are some degenerative  changes noted at L4 L5-L5 S1.  This could definitely contribute to some of her symptoms.  Hypertensive initially likely due to pain.    Problems Addressed:  Contusion of lower back, initial encounter: acute illness or injury  Fall, initial encounter: acute illness or injury  Hypertension, unspecified type: chronic illness or injury with exacerbation, progression, or side effects of treatment     Details: Exacerbated by current condition.    Amount and/or Complexity of Data Reviewed  Labs: ordered.     Details: Within normal limits.  Radiology: ordered.     Details: No acute fractures findings as above.    Risk  Prescription drug management.                                      Clinical Impression:  Final diagnoses:  [R53.1] Weakness  [W19.XXXA] Fall, initial encounter (Primary)  [S30.0XXA] Contusion of lower back, initial encounter  [I10] Hypertension, unspecified type          ED Disposition Condition    Discharge Stable          ED Prescriptions    None       Follow-up Information       Follow up With Specialties Details Why Contact Info    Kimmy Lopez PA-C Internal Medicine Schedule an appointment as soon as possible for a visit in 2 days  [address] [place] [id number]  [phone]      [facility] - Emergency Dept Emergency Medicine  If symptoms worsen, As needed [facility] [id number] [phone]             Babs Rhodes PA  06/14/24 1315       Babs Rhodes PA  06/14/24 4434

## 2024-07-31 ENCOUNTER — HOSPITAL ENCOUNTER (EMERGENCY)
Facility: HOSPITAL | Age: 59
Discharge: HOME | End: 2024-07-31
Attending: STUDENT IN AN ORGANIZED HEALTH CARE EDUCATION/TRAINING PROGRAM
Payer: COMMERCIAL

## 2024-07-31 VITALS
BODY MASS INDEX: 35.44 KG/M2 | HEIGHT: 63 IN | DIASTOLIC BLOOD PRESSURE: 76 MMHG | SYSTOLIC BLOOD PRESSURE: 112 MMHG | TEMPERATURE: 99 F | WEIGHT: 200 LBS | RESPIRATION RATE: 15 BRPM | HEART RATE: 89 BPM | OXYGEN SATURATION: 100 %

## 2024-07-31 DIAGNOSIS — S40.012A CONTUSION OF LEFT SHOULDER, INITIAL ENCOUNTER: Primary | ICD-10-CM

## 2024-07-31 DIAGNOSIS — M25.522 LEFT ELBOW PAIN: ICD-10-CM

## 2024-07-31 PROCEDURE — 99283 EMERGENCY DEPT VISIT LOW MDM: CPT | Mod: 25

## 2024-07-31 RX ORDER — DICLOFENAC SODIUM 50 MG/1
50 TABLET, DELAYED RELEASE ORAL 3 TIMES DAILY PRN
Qty: 15 TABLET | Refills: 0 | Status: SHIPPED | OUTPATIENT
Start: 2024-07-31 | End: 2024-08-05

## 2024-09-25 ENCOUNTER — HOSPITAL ENCOUNTER (EMERGENCY)
Facility: HOSPITAL | Age: 59
Discharge: LEFT BEFORE DISCHARGE | End: 2024-09-26
Payer: COMMERCIAL

## 2024-09-25 VITALS
TEMPERATURE: 98 F | SYSTOLIC BLOOD PRESSURE: 115 MMHG | BODY MASS INDEX: 33.66 KG/M2 | WEIGHT: 190 LBS | DIASTOLIC BLOOD PRESSURE: 74 MMHG | HEART RATE: 74 BPM | RESPIRATION RATE: 18 BRPM | HEIGHT: 63 IN | OXYGEN SATURATION: 99 %

## 2024-09-25 PROCEDURE — 99900041 HC LEFT WITHOUT BEING SEEN- EMERGENCY

## 2024-09-26 ENCOUNTER — HOSPITAL ENCOUNTER (EMERGENCY)
Facility: HOSPITAL | Age: 59
Discharge: HOME | End: 2024-09-26
Attending: EMERGENCY MEDICINE
Payer: MEDICARE

## 2024-09-26 ENCOUNTER — DOCUMENTATION ONLY (OUTPATIENT)
Dept: RHEUMATOLOGY | Facility: CLINIC | Age: 59
End: 2024-09-26
Payer: COMMERCIAL

## 2024-09-26 VITALS
HEART RATE: 75 BPM | OXYGEN SATURATION: 99 % | HEIGHT: 63 IN | DIASTOLIC BLOOD PRESSURE: 66 MMHG | TEMPERATURE: 98 F | BODY MASS INDEX: 33.66 KG/M2 | WEIGHT: 190 LBS | SYSTOLIC BLOOD PRESSURE: 94 MMHG | RESPIRATION RATE: 20 BRPM

## 2024-09-26 DIAGNOSIS — R07.81 RIB PAIN ON RIGHT SIDE: ICD-10-CM
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
## 2024-09-26 PROCEDURE — 96372 THER/PROPH/DIAG INJ SC/IM: CPT | Performed by: PHYSICIAN ASSISTANT

## 2024-09-26 PROCEDURE — 63600175 PHARM REV CODE 636 W HCPCS: Performed by: PHYSICIAN ASSISTANT

## 2024-09-26 PROCEDURE — 99284 EMERGENCY DEPT VISIT MOD MDM: CPT | Mod: 25

## 2024-09-26 RX ORDER — KETOROLAC TROMETHAMINE 30 MG/ML
30 INJECTION, SOLUTION INTRAMUSCULAR; INTRAVENOUS
Status: COMPLETED | OUTPATIENT
Start: 2024-09-26 | End: 2024-09-26

## 2024-09-26 RX ORDER — METHOCARBAMOL 750 MG/1
1500 TABLET, FILM COATED ORAL 3 TIMES DAILY
Qty: 42 TABLET | Refills: 0 | Status: SHIPPED | OUTPATIENT
Start: 2024-09-26 | End: 2024-10-03

## 2024-09-26 RX ORDER — DICLOFENAC SODIUM 50 MG/1
50 TABLET, DELAYED RELEASE ORAL 3 TIMES DAILY
Qty: 21 TABLET | Refills: 0 | Status: SHIPPED | OUTPATIENT
Start: 2024-09-26 | End: 2024-10-03

## 2024-09-26 RX ADMIN — KETOROLAC TROMETHAMINE 30 MG: 30 INJECTION, SOLUTION INTRAMUSCULAR; INTRAVENOUS at 01:09

## 2024-09-26 NOTE — ED PROVIDER NOTES
Encounter Date: 9/26/2024       History     Chief Complaint   Patient presents with    Fall     Pt c/o pain to right rib area and back, s/p fall last night.     59-year-old female presents to ED for evaluation of right rib/back pain after slip and fall last night.  Patient reports that she slipped and fell on her right side.  Complains of rib pain.  Reports pain worse with movement.  Patient states she initially had shoulder pain however pain has since improved.  Denies hitting her head.  Denies any loss of consciousness.  Denies any nausea or vomiting.  Denies any low back pain.  Denies any saddle anesthesia loss of bowel or urinary incontinence.  Patient states that she did not take any medication prior to arrival in his driving home.    The history is provided by the patient. No  was used.     Review of patient's allergies indicates:   Allergen Reactions    Penicillins Other (See Comments) and Itching     unknown  Other reaction(s): digestive, nausea    Adhesive tape-silicones Rash     Other reaction(s): allergic to paper tape only, paper tape allergy    Codeine Itching     Other reaction(s): penicillin G benzathine    Lamotrigine Rash     Past Medical History:   Diagnosis Date    Allergy     Arthritis     Asthma     Bipolar 1 disorder     Carpal tunnel syndrome     right    Diabetes mellitus     Dry eyes     GERD (gastroesophageal reflux disease)     Glaucoma     Hyperlipidemia     Hypertension     Narcolepsy     OCD (obsessive compulsive disorder)     Osteoporosis     Plantar fascia syndrome     Thyroid disease     Urinary incontinence      Past Surgical History:   Procedure Laterality Date    ADENOIDECTOMY  1985    APPENDECTOMY      CARPAL TUNNEL RELEASE Right     CATARACT EXTRACTION      CHOLECYSTECTOMY      DILATION AND CURETTAGE OF UTERUS      EYE SURGERY  2005    PLANTAR FASCIA SURGERY      ROTATOR CUFF REPAIR Right     TONSILLECTOMY      TUBAL LIGATION       Family History   Problem  Relation Name Age of Onset    Stroke Mother Maria Eugenia Winters     Hypertension Mother Maria Eugenia Winters     Heart disease Mother Maria Eugenia Winters     Heart attack Mother Maria Eugenia Winters     Arthritis Mother Maria Eugenia Winters     Depression Mother Maria Eugenia Winters     Diabetes Mother Maria Eugenia Winters     Mental illness Mother Maria Eugenia Winters     Miscarriages / Stillbirths Mother Maria Eugenia Winters     Cancer Father Dedrick Winters     Hypertension Father Dedrick Winters     Diabetes Mellitus Father Dedrick Winters     Alcohol abuse Father Dedrick Winters     Diabetes Father Dedrick Winters     Vision loss Father Dedrick Winters     Cancer Paternal Grandfather Kade Winters     Cancer Paternal Grandmother Lola Winters      Social History     Tobacco Use    Smoking status: Every Day     Current packs/day: 1.00     Average packs/day: 1 pack/day for 43.4 years (43.4 ttl pk-yrs)     Types: Cigarettes     Start date: 4/18/1981    Smokeless tobacco: Never   Substance Use Topics    Alcohol use: No    Drug use: No     Review of Systems   Constitutional:  Negative for chills, fatigue and fever.   Respiratory:  Negative for shortness of breath.    Cardiovascular:  Negative for chest pain.   Gastrointestinal:  Negative for abdominal pain, diarrhea, nausea and vomiting.   Genitourinary:  Negative for dysuria, flank pain, frequency and urgency.   Musculoskeletal:  Positive for arthralgias, back pain and myalgias.   All other systems reviewed and are negative.      Physical Exam     Initial Vitals [09/26/24 1311]   BP Pulse Resp Temp SpO2   94/66 75 20 97.9 °F (36.6 °C) 99 %      MAP       --         Physical Exam    Nursing note and vitals reviewed.  Constitutional: She appears well-developed and well-nourished.   HENT:   Head: Normocephalic and atraumatic.   Right Ear: Tympanic membrane and external ear normal.   Left Ear: Tympanic membrane and external ear normal.   Mouth/Throat: Uvula is midline, oropharynx is clear and moist and mucous membranes are normal. No trismus  in the jaw. No uvula swelling. No oropharyngeal exudate, posterior oropharyngeal edema or posterior oropharyngeal erythema.   Eyes: Conjunctivae are normal. Pupils are equal, round, and reactive to light.   Neck: Neck supple.   Normal range of motion.  Cardiovascular:  Normal rate, regular rhythm and normal heart sounds.           Pulmonary/Chest: Breath sounds normal. She has no wheezes. She has no rhonchi. She has no rales. She exhibits tenderness. She exhibits no mass, no bony tenderness, no crepitus, no edema, no deformity and no retraction.     Abdominal: Abdomen is soft. Bowel sounds are normal. There is no abdominal tenderness. There is no rebound and no guarding.   Musculoskeletal:         General: Normal range of motion.      Cervical back: Normal range of motion and neck supple.     Neurological: She is alert and oriented to person, place, and time. She has normal strength. No cranial nerve deficit or sensory deficit. GCS score is 15. GCS eye subscore is 4. GCS verbal subscore is 5. GCS motor subscore is 6.   Skin: Skin is warm and dry.   Psychiatric: She has a normal mood and affect.         ED Course   Procedures  Labs Reviewed - No data to display       Imaging Results              XR Ribs Min 3 Views w/PA Chest Right (Final result)  Result time 09/26/24 14:58:26      Final result by Duglas Jacobo MD (09/26/24 14:58:26)                   Narrative:    EXAMINATION  XR RIBS MIN 3 VIEWS W/ PA CHEST RIGHT    CLINICAL HISTORY  fall right rib pain;    TECHNIQUE  A total of 5 images of the right ribs, submitted with PA view of the chest.    COMPARISON  24 May 2021    FINDINGS  Lines/tubes/devices: none present    The cardiomediastinal silhouette and central pulmonary vasculature are unremarkable.  The trachea is midline. Chronic bilateral lung parenchymal changes are similar in comparison.  No acute consolidation or pleural effusion is identified.  There is no convincing pneumothorax.    There are similar  degenerative alterations of the shoulders and spinal column.  No convincing acutely displaced fracture is appreciated.    IMPRESSION  1. No acute cardiopulmonary abnormality.  2. No convincing displaced rib fracture.      Electronically signed by: Duglas Jacobo  Date:    09/26/2024  Time:    14:58                                     Medications   ketorolac injection 30 mg (30 mg Intramuscular Given 9/26/24 4681)     Medical Decision Making  59-year-old female presents to ED for evaluation of right rib/back pain after slip and fall last night.  Patient reports that she slipped and fell on her right side.  Complains of rib pain.  Reports pain worse with movement.  Patient states she initially had shoulder pain however pain has since improved.  Denies hitting her head.  Denies any loss of consciousness.  Denies any nausea or vomiting.  Denies any low back pain.  Denies any saddle anesthesia loss of bowel or urinary incontinence.  Patient states that she did not take any medication prior to arrival in his driving home.    Differential diagnosis includes but isn't limited to fall, rib pain, rib fracture, contusion, musculoskeletal pain    Amount and/or Complexity of Data Reviewed  Radiology: ordered.  Discussion of management or test interpretation with external provider(s): Patient GCS 15 and neuro intact.  Ambulatory with steady gait.  Presents to ED for evaluation of right rib pain after slip and fall yesterday.  Patient denies hitting her head or loss of consciousness.  Tenderness noted to her right ribs.  X-ray obtained without any acute findings.  Patient given IM Toradol here.  Will discharge home with short course of NSAIDs and muscle relaxers.  Discussed return ED precautions.  Patient verbalizes understanding and agrees with plan of care.    Risk  OTC drugs.  Prescription drug management.                                      Clinical Impression:  Final diagnoses:  [W19.XXXA] Fall, initial encounter  (Primary)  [R07.81] Rib pain on right side          ED Disposition Condition    Discharge Stable          ED Prescriptions       Medication Sig Dispense Start Date End Date Auth. Provider    diclofenac (VOLTAREN) 50 MG EC tablet Take 1 tablet (50 mg total) by mouth 3 (three) times daily. for 7 days 21 tablet 9/26/2024 10/3/2024 Praveena Stephens PA    methocarbamoL (ROBAXIN) 750 MG Tab Take 2 tablets (1,500 mg total) by mouth 3 (three) times daily. for 7 days 42 tablet 9/26/2024 10/3/2024 Praveena Stephens PA          Follow-up Information       Follow up With Specialties Details Why Contact Info    Kimmy Lopez PA-C Internal Medicine Call   [address] [address] [phone]               Praveena Stephens PA  09/26/24 2292

## 2024-09-26 NOTE — DISCHARGE INSTRUCTIONS
Use ice and heat therapy, 20 minutes on and 20 minutes off.      You have been prescribed Diclofenac for pain. This is an Non-Steroidal Anti-Inflammatory (NSAID) Medication. Please do not take any additional NSAIDs while you are taking this medication including (Advil, Aleve, Motrin, Ibuprofen, Mobic\meloxicam, Naprosyn, Toradol, ketoralac, etc.). Please stop taking this medication if you experience: weakness, itching, yellow skin or eyes, joint pains, vomiting blood, blood or black stools, unusual weight gain, or swelling in your arms, legs, hands, or feet.     You have been prescribed Robaxin (Methocarbamol) for muscle spasms/pain. Please do not take this medication while working, drinking alcohol, swimming, or while driving/operating heavy machinery. This medication may cause drowsiness, dizziness, impair judgment, and reduce physical capabilities.You should not drive, operate heavy machinery, or make life changing decisions while taking this medication.

## 2024-10-17 DIAGNOSIS — N18.30 CHRONIC KIDNEY DISEASE, STAGE III (MODERATE): Primary | ICD-10-CM

## 2024-11-19 ENCOUNTER — PATIENT MESSAGE (OUTPATIENT)
Dept: GASTROENTEROLOGY | Facility: CLINIC | Age: 59
End: 2024-11-19
Payer: COMMERCIAL

## 2024-11-21 ENCOUNTER — HOSPITAL ENCOUNTER (OUTPATIENT)
Dept: RADIOLOGY | Facility: HOSPITAL | Age: 59
Discharge: HOME | End: 2024-11-21
Attending: INTERNAL MEDICINE
Payer: MEDICARE

## 2024-11-21 DIAGNOSIS — N18.30 CHRONIC KIDNEY DISEASE, STAGE III (MODERATE): ICD-10-CM

## 2024-11-21 PROCEDURE — 76770 US EXAM ABDO BACK WALL COMP: CPT | Mod: TC

## 2024-12-13 ENCOUNTER — LAB VISIT (OUTPATIENT)
Dept: LAB | Facility: HOSPITAL | Age: 59
End: 2024-12-13
Attending: INTERNAL MEDICINE
Payer: MEDICARE

## 2024-12-13 DIAGNOSIS — E11.9 DIABETES: ICD-10-CM

## 2024-12-13 DIAGNOSIS — I10 ESSENTIAL HYPERTENSION, MALIGNANT: Primary | ICD-10-CM

## 2024-12-13 LAB
ALBUMIN SERPL-MCNC: 3.6 G/DL (ref 3.4–4.8)
ALBUMIN/GLOB SERPL: 1.2 RATIO (ref 1.1–2)
ALP SERPL-CCNC: 131 UNIT/L (ref 40–150)
ALT SERPL-CCNC: 25 UNIT/L (ref 0–55)
ANION GAP SERPL CALC-SCNC: 6 MEQ/L
AST SERPL-CCNC: 28 UNIT/L (ref 5–34)
BACTERIA #/AREA URNS AUTO: ABNORMAL /HPF
BILIRUB SERPL-MCNC: 0.4 MG/DL
BILIRUB UR QL STRIP.AUTO: NEGATIVE
BUN SERPL-MCNC: 24.2 MG/DL (ref 9.8–20.1)
CALCIUM SERPL-MCNC: 8.3 MG/DL (ref 8.4–10.2)
CHLORIDE SERPL-SCNC: 95 MMOL/L (ref 98–107)
CK SERPL-CCNC: 116 U/L (ref 29–168)
CLARITY UR: CLEAR
CO2 SERPL-SCNC: 31 MMOL/L (ref 23–31)
COLOR UR AUTO: ABNORMAL
CREAT SERPL-MCNC: 0.99 MG/DL (ref 0.55–1.02)
CREAT UR-MCNC: 58.4 MG/DL (ref 45–106)
CREAT/UREA NIT SERPL: 24
CRP SERPL-MCNC: 19.7 MG/L
ERYTHROCYTE [DISTWIDTH] IN BLOOD BY AUTOMATED COUNT: 14.6 % (ref 11.5–17)
ERYTHROCYTE [SEDIMENTATION RATE] IN BLOOD: 53 MM/HR (ref 0–20)
GFR SERPLBLD CREATININE-BSD FMLA CKD-EPI: >60 ML/MIN/1.73/M2
GLOBULIN SER-MCNC: 3.1 GM/DL (ref 2.4–3.5)
GLUCOSE SERPL-MCNC: 91 MG/DL (ref 82–115)
GLUCOSE UR QL STRIP: NORMAL
HCT VFR BLD AUTO: 36.4 % (ref 37–47)
HGB BLD-MCNC: 11.9 G/DL (ref 12–16)
HGB UR QL STRIP: NEGATIVE
KETONES UR QL STRIP: NEGATIVE
LDH SERPL-CCNC: 185 U/L (ref 125–220)
LEUKOCYTE ESTERASE UR QL STRIP: NEGATIVE
MAGNESIUM SERPL-MCNC: 2 MG/DL (ref 1.6–2.6)
MCH RBC QN AUTO: 28.9 PG (ref 27–31)
MCHC RBC AUTO-ENTMCNC: 32.7 G/DL (ref 33–36)
MCV RBC AUTO: 88.3 FL (ref 80–94)
MICROALBUMIN UR-MCNC: <5 UG/ML
MICROALBUMIN/CREAT RATIO PNL UR: NORMAL
MUCOUS THREADS URNS QL MICRO: ABNORMAL /LPF
NITRITE UR QL STRIP: NEGATIVE
NRBC BLD AUTO-RTO: 0 %
PH UR STRIP: 7 [PH]
PLATELET # BLD AUTO: 222 X10(3)/MCL (ref 130–400)
PMV BLD AUTO: 11.3 FL (ref 7.4–10.4)
POTASSIUM SERPL-SCNC: 4.2 MMOL/L (ref 3.5–5.1)
PROT SERPL-MCNC: 6.7 GM/DL (ref 5.8–7.6)
PROT UR QL STRIP: NEGATIVE
RBC # BLD AUTO: 4.12 X10(6)/MCL (ref 4.2–5.4)
RBC #/AREA URNS AUTO: ABNORMAL /HPF
SODIUM SERPL-SCNC: 132 MMOL/L (ref 136–145)
SP GR UR STRIP.AUTO: 1.01 (ref 1–1.03)
SQUAMOUS #/AREA URNS LPF: ABNORMAL /HPF
URATE SERPL-MCNC: 3.5 MG/DL (ref 2.6–6)
UROBILINOGEN UR STRIP-ACNC: NORMAL
WBC # BLD AUTO: 7.09 X10(3)/MCL (ref 4.5–11.5)
WBC #/AREA URNS AUTO: ABNORMAL /HPF

## 2024-12-13 PROCEDURE — 83615 LACTATE (LD) (LDH) ENZYME: CPT

## 2024-12-13 PROCEDURE — 81001 URINALYSIS AUTO W/SCOPE: CPT

## 2024-12-13 PROCEDURE — 36415 COLL VENOUS BLD VENIPUNCTURE: CPT

## 2024-12-13 PROCEDURE — 80053 COMPREHEN METABOLIC PANEL: CPT

## 2024-12-13 PROCEDURE — 84550 ASSAY OF BLOOD/URIC ACID: CPT

## 2024-12-13 PROCEDURE — 85652 RBC SED RATE AUTOMATED: CPT

## 2024-12-13 PROCEDURE — 85027 COMPLETE CBC AUTOMATED: CPT

## 2024-12-13 PROCEDURE — 86140 C-REACTIVE PROTEIN: CPT

## 2024-12-13 PROCEDURE — 82550 ASSAY OF CK (CPK): CPT

## 2024-12-13 PROCEDURE — 83735 ASSAY OF MAGNESIUM: CPT

## 2024-12-13 PROCEDURE — 82043 UR ALBUMIN QUANTITATIVE: CPT

## 2025-05-06 ENCOUNTER — LAB VISIT (OUTPATIENT)
Dept: LAB | Facility: HOSPITAL | Age: 60
End: 2025-05-06
Attending: INTERNAL MEDICINE
Payer: MEDICARE

## 2025-05-06 DIAGNOSIS — R73.09 IMPAIRED GLUCOSE TOLERANCE TEST: Primary | ICD-10-CM

## 2025-05-06 DIAGNOSIS — E11.9 DIABETES MELLITUS WITHOUT COMPLICATION: ICD-10-CM

## 2025-05-06 DIAGNOSIS — M32.9 SYSTEMIC LUPUS ERYTHEMATOSUS: ICD-10-CM

## 2025-05-06 DIAGNOSIS — I10 ESSENTIAL HYPERTENSION, MALIGNANT: ICD-10-CM

## 2025-05-06 DIAGNOSIS — E55.9 AVITAMINOSIS D: ICD-10-CM

## 2025-05-06 LAB
25(OH)D3+25(OH)D2 SERPL-MCNC: 63 NG/ML (ref 30–80)
ALBUMIN SERPL-MCNC: 3.5 G/DL (ref 3.4–4.8)
ALBUMIN/GLOB SERPL: 0.9 RATIO (ref 1.1–2)
ALP SERPL-CCNC: 133 UNIT/L (ref 40–150)
ALT SERPL-CCNC: 22 UNIT/L (ref 0–55)
ANION GAP SERPL CALC-SCNC: 8 MEQ/L
AST SERPL-CCNC: 27 UNIT/L (ref 11–45)
BACTERIA #/AREA URNS AUTO: ABNORMAL /HPF
BILIRUB SERPL-MCNC: 0.2 MG/DL
BILIRUB UR QL STRIP.AUTO: NEGATIVE
BUN SERPL-MCNC: 24.6 MG/DL (ref 9.8–20.1)
CALCIUM SERPL-MCNC: 9.1 MG/DL (ref 8.4–10.2)
CHLORIDE SERPL-SCNC: 106 MMOL/L (ref 98–107)
CLARITY UR: CLEAR
CO2 SERPL-SCNC: 30 MMOL/L (ref 23–31)
COLOR UR AUTO: ABNORMAL
CREAT SERPL-MCNC: 0.81 MG/DL (ref 0.55–1.02)
CREAT/UREA NIT SERPL: 30
ERYTHROCYTE [DISTWIDTH] IN BLOOD BY AUTOMATED COUNT: 15 % (ref 11.5–17)
EST. AVERAGE GLUCOSE BLD GHB EST-MCNC: 111.2 MG/DL
GFR SERPLBLD CREATININE-BSD FMLA CKD-EPI: >60 ML/MIN/1.73/M2
GLOBULIN SER-MCNC: 3.9 GM/DL (ref 2.4–3.5)
GLUCOSE SERPL-MCNC: 67 MG/DL (ref 82–115)
GLUCOSE UR QL STRIP: NORMAL
HBA1C MFR BLD: 5.5 %
HCT VFR BLD AUTO: 40.4 % (ref 37–47)
HGB BLD-MCNC: 12.9 G/DL (ref 12–16)
HGB UR QL STRIP: NEGATIVE
KETONES UR QL STRIP: NEGATIVE
LEUKOCYTE ESTERASE UR QL STRIP: NEGATIVE
MCH RBC QN AUTO: 28.7 PG (ref 27–31)
MCHC RBC AUTO-ENTMCNC: 31.9 G/DL (ref 33–36)
MCV RBC AUTO: 90 FL (ref 80–94)
MUCOUS THREADS URNS QL MICRO: ABNORMAL /LPF
NITRITE UR QL STRIP: NEGATIVE
NRBC BLD AUTO-RTO: 0 %
PH UR STRIP: 7 [PH]
PLATELET # BLD AUTO: 196 X10(3)/MCL (ref 130–400)
PMV BLD AUTO: 11.7 FL (ref 7.4–10.4)
POTASSIUM SERPL-SCNC: 3.8 MMOL/L (ref 3.5–5.1)
PROT SERPL-MCNC: 7.4 GM/DL (ref 5.8–7.6)
PROT UR QL STRIP: NEGATIVE
RBC # BLD AUTO: 4.49 X10(6)/MCL (ref 4.2–5.4)
RBC #/AREA URNS AUTO: ABNORMAL /HPF
SODIUM SERPL-SCNC: 144 MMOL/L (ref 136–145)
SP GR UR STRIP.AUTO: 1.02 (ref 1–1.03)
SQUAMOUS #/AREA URNS LPF: ABNORMAL /HPF
URATE SERPL-MCNC: 2.4 MG/DL (ref 2.6–6)
UROBILINOGEN UR STRIP-ACNC: NORMAL
WBC # BLD AUTO: 6.81 X10(3)/MCL (ref 4.5–11.5)
WBC #/AREA URNS AUTO: ABNORMAL /HPF

## 2025-05-06 PROCEDURE — 81001 URINALYSIS AUTO W/SCOPE: CPT

## 2025-05-06 PROCEDURE — 84550 ASSAY OF BLOOD/URIC ACID: CPT

## 2025-05-06 PROCEDURE — 80053 COMPREHEN METABOLIC PANEL: CPT

## 2025-05-06 PROCEDURE — 36415 COLL VENOUS BLD VENIPUNCTURE: CPT

## 2025-05-06 PROCEDURE — 83036 HEMOGLOBIN GLYCOSYLATED A1C: CPT

## 2025-05-06 PROCEDURE — 85027 COMPLETE CBC AUTOMATED: CPT

## 2025-05-06 PROCEDURE — 82306 VITAMIN D 25 HYDROXY: CPT

## 2025-06-25 ENCOUNTER — LAB VISIT (OUTPATIENT)
Dept: LAB | Facility: HOSPITAL | Age: 60
End: 2025-06-25
Attending: INTERNAL MEDICINE
Payer: MEDICARE

## 2025-06-25 DIAGNOSIS — E11.9 DM TYPE 2 (DIABETES MELLITUS, TYPE 2): ICD-10-CM

## 2025-06-25 DIAGNOSIS — E78.5 HYPERLIPIDEMIA, UNSPECIFIED HYPERLIPIDEMIA TYPE: ICD-10-CM

## 2025-06-25 DIAGNOSIS — I10 HYPERTENSION, UNSPECIFIED TYPE: Primary | ICD-10-CM

## 2025-06-25 LAB
ALBUMIN SERPL-MCNC: 3.4 G/DL (ref 3.4–4.8)
ALBUMIN/GLOB SERPL: 0.9 RATIO (ref 1.1–2)
ALP SERPL-CCNC: 134 UNIT/L (ref 40–150)
ALT SERPL-CCNC: 21 UNIT/L (ref 0–55)
ANION GAP SERPL CALC-SCNC: 7 MEQ/L
AST SERPL-CCNC: 26 UNIT/L (ref 11–45)
BILIRUB SERPL-MCNC: 0.2 MG/DL
BUN SERPL-MCNC: 19.1 MG/DL (ref 9.8–20.1)
CALCIUM SERPL-MCNC: 8.3 MG/DL (ref 8.4–10.2)
CHLORIDE SERPL-SCNC: 107 MMOL/L (ref 98–107)
CHOLEST SERPL-MCNC: 120 MG/DL
CHOLEST/HDLC SERPL: 3 {RATIO} (ref 0–5)
CO2 SERPL-SCNC: 25 MMOL/L (ref 23–31)
CREAT SERPL-MCNC: 0.79 MG/DL (ref 0.55–1.02)
CREAT/UREA NIT SERPL: 24
EST. AVERAGE GLUCOSE BLD GHB EST-MCNC: 111.2 MG/DL
GFR SERPLBLD CREATININE-BSD FMLA CKD-EPI: >60 ML/MIN/1.73/M2
GLOBULIN SER-MCNC: 4 GM/DL (ref 2.4–3.5)
GLUCOSE SERPL-MCNC: 102 MG/DL (ref 82–115)
HBA1C MFR BLD: 5.5 %
HDLC SERPL-MCNC: 44 MG/DL (ref 35–60)
LDLC SERPL CALC-MCNC: 57 MG/DL (ref 50–140)
POTASSIUM SERPL-SCNC: 4.2 MMOL/L (ref 3.5–5.1)
PROT SERPL-MCNC: 7.4 GM/DL (ref 5.8–7.6)
SODIUM SERPL-SCNC: 139 MMOL/L (ref 136–145)
TRIGL SERPL-MCNC: 93 MG/DL (ref 37–140)
TSH SERPL-ACNC: 3.54 UIU/ML (ref 0.35–4.94)
VLDLC SERPL CALC-MCNC: 19 MG/DL

## 2025-06-25 PROCEDURE — 36415 COLL VENOUS BLD VENIPUNCTURE: CPT

## 2025-06-25 PROCEDURE — 84443 ASSAY THYROID STIM HORMONE: CPT

## 2025-06-25 PROCEDURE — 80061 LIPID PANEL: CPT

## 2025-06-25 PROCEDURE — 83036 HEMOGLOBIN GLYCOSYLATED A1C: CPT

## 2025-06-25 PROCEDURE — 80053 COMPREHEN METABOLIC PANEL: CPT
